# Patient Record
Sex: FEMALE | Employment: FULL TIME | URBAN - METROPOLITAN AREA
[De-identification: names, ages, dates, MRNs, and addresses within clinical notes are randomized per-mention and may not be internally consistent; named-entity substitution may affect disease eponyms.]

---

## 2023-03-09 ENCOUNTER — EVALUATION (OUTPATIENT)
Dept: PHYSICAL THERAPY | Facility: CLINIC | Age: 58
End: 2023-03-09

## 2023-03-09 DIAGNOSIS — M54.2 NECK PAIN: Primary | ICD-10-CM

## 2023-03-09 NOTE — PROGRESS NOTES
PT Evaluation     Today's date: 3/9/2023  Patient name: Phong Fox  : 1965  MRN: [id number]  Referring provider: Heath Abdalla MD  Dx:   Encounter Diagnosis     ICD-10-CM    1  Neck pain  M54 2                      Assessment  Assessment details: 61year old female patient reports to PT with chronic neck pain  No red flags noted and UQS showed gross R UE decreased strength mostly due to pain  Patient presents with high irritability of symptoms, so objective testing was limited, but patient has significant decreased cervical mobility and tension in her cervical musculature  Patient also has decreased motor coordination of postural and scapular musculature  Patient will benefit from skilled PT services to address current impairments and functional limitations to help patient return to her PLOF  Impairments: abnormal muscle firing, abnormal muscle tone, abnormal or restricted ROM, abnormal movement, activity intolerance, impaired physical strength, lacks appropriate home exercise program and pain with function    Symptom irritability: highUnderstanding of Dx/Px/POC: good   Prognosis: good    Goals  STG  1  Patient will be independent with completion of HEP throughout therapy  2  Patient will have at worst 7/10 pain so patient can sleep with less discomfort in 3 weeks  LTG  1  Patient will have decrease in irritability of symptoms so patient can tolerate her daily tasks with less difficulty in 4 weeks     2  Patient will reach predicted FOTO score at end of treatment    Plan  Patient would benefit from: skilled physical therapy  Planned therapy interventions: activity modification, abdominal trunk stabilization, joint mobilization, manual therapy, motor coordination training, neuromuscular re-education, patient education, strengthening, stretching, therapeutic activities, therapeutic exercise, home exercise program, functional ROM exercises, flexibility and body mechanics training  Frequency: 2x week  Duration in weeks: 6  Treatment plan discussed with: patient        Subjective Evaluation    History of Present Illness  Mechanism of injury: Patient has chronic neck and back pain for years  Patient notes R side of her neck is the worst  Patient notes she gets bad headaches due to her neck muscles being so tense  Patient has difficulty looking up and rotating her head  Patient notes she has radiating R UE pain that also goes numb in her thumb and index finger  Patient also has difficulty sleeping  Patient can't wear a bra due to it pulling on her neck  Patient notes she is in the talks with her neurosurgeon to maybe get surgery after this bout of physical therapy  Patient notes she also has fibromyalgia as she has days where she can't move  Patient notes had bout of PT services which didn't help much  Pain  Current pain ratin  At best pain ratin  At worst pain rating: 10  Quality: dull ache, burning, sharp, radiating, throbbing, tight and discomfort  Relieving factors: medications, change in position, ice and heat  Aggravating factors: sitting, standing, walking, stair climbing and lifting    Treatments  Previous treatment: injection treatment and physical therapy  Current treatment: physical therapy  Patient Goals  Patient goals for therapy: decreased pain, increased motion, increased strength and return to sport/leisure activities          Objective     Concurrent Complaints  Positive for night pain, disturbed sleep, dizziness and headaches   Negative for faints and nausea/motion sickness    Neurological Testing     Sensation   Cervical/Thoracic   Left   Intact: light touch    Right   Intact: light touch    Active Range of Motion   Cervical/Thoracic Spine       Cervical    Flexion:  with pain Restriction level: moderate  Extension:  with pain Restriction level: maximal  Left rotation:  with pain Restriction level: moderate  Right rotation:  with pain Restriction level: maximal    Strength/Myotome Testing   Cervical Spine     Left   Interossei strength (t1): 4    Right   Interossei strength (t1): 3+    Left Elbow   Flexion: 4  Extension: 4    Right Elbow   Flexion: 3+ (affected by pain)  Extension: 3+ (affected by pain)    Left Wrist/Hand   Wrist extension: 4  Wrist flexion: 4  Thumb extension: 4    Right Wrist/Hand   Wrist extension: 4  Wrist flexion: 4  Thumb extension: 4  Neuro Exam:     Headaches   Patient reports headaches: Yes                Precautions: highly irritable neck and low back symptoms that are chronic, DOESN'T LIKE TO LIE ON BACK AT ALL       Manuals 3/9            Seated cerv mx work                                                    Neuro Re-Ed             Seated chin tucks                          scap 4                                                                  Ther Ex             scap retractions seated r            Seated thoracic rotation B r                         UBE or treadmill             Seated thoracic extension             No moneys              Seated CT butterfly stretch If able                          Ther Activity                                       Gait Training                                       Modalities

## 2023-03-09 NOTE — LETTER
2023    Rm Chao Gretta  [address]    Patient: Jesús Atkinson   YOB: 1965   Date of Visit: 3/9/2023     Encounter Diagnosis     ICD-10-CM    1  Neck pain  M54 2           Dear Dr Maru oDminguez:    Thank you for your recent referral of Jesús Atkinson  Please review the attached evaluation summary from Marcella's recent visit  Please verify that you agree with the plan of care by signing the attached order  If you have any questions or concerns, please do not hesitate to call  I sincerely appreciate the opportunity to share in the care of one of your patients and hope to have another opportunity to work with you in the near future  Sincerely,    Gregory Bermudez, PT      Referring Provider:      I certify that I have read the below Plan of Care and certify the need for these services furnished under this plan of treatment while under my care  MD Navi Chao Cortes [id number] [facility] [address]  Via Fax: [phone]          PT Evaluation     Today's date: 3/9/2023  Patient name: Jesús Atkinson  : 1965  MRN: [id number]  Referring provider: Ronda Dixon MD  Dx:   Encounter Diagnosis     ICD-10-CM    1  Neck pain  M54 2                      Assessment  Assessment details: 61year old female patient reports to PT with chronic neck pain  No red flags noted and UQS showed gross R UE decreased strength mostly due to pain  Patient presents with high irritability of symptoms, so objective testing was limited, but patient has significant decreased cervical mobility and tension in her cervical musculature  Patient also has decreased motor coordination of postural and scapular musculature  Patient will benefit from skilled PT services to address current impairments and functional limitations to help patient return to her PLOF       Impairments: abnormal muscle firing, abnormal muscle tone, abnormal or restricted ROM, abnormal movement, activity intolerance, impaired physical strength, lacks appropriate home exercise program and pain with function    Symptom irritability: highUnderstanding of Dx/Px/POC: good   Prognosis: good    Goals  STG  1  Patient will be independent with completion of HEP throughout therapy  2  Patient will have at worst 7/10 pain so patient can sleep with less discomfort in 3 weeks  LTG  1  Patient will have decrease in irritability of symptoms so patient can tolerate her daily tasks with less difficulty in 4 weeks  2  Patient will reach predicted FOTO score at end of treatment    Plan  Patient would benefit from: skilled physical therapy  Planned therapy interventions: activity modification, abdominal trunk stabilization, joint mobilization, manual therapy, motor coordination training, neuromuscular re-education, patient education, strengthening, stretching, therapeutic activities, therapeutic exercise, home exercise program, functional ROM exercises, flexibility and body mechanics training  Frequency: 2x week  Duration in weeks: 6  Treatment plan discussed with: patient        Subjective Evaluation    History of Present Illness  Mechanism of injury: Patient has chronic neck and back pain for years  Patient notes R side of her neck is the worst  Patient notes she gets bad headaches due to her neck muscles being so tense  Patient has difficulty looking up and rotating her head  Patient notes she has radiating R UE pain that also goes numb in her thumb and index finger  Patient also has difficulty sleeping  Patient can't wear a bra due to it pulling on her neck  Patient notes she is in the talks with her neurosurgeon to maybe get surgery after this bout of physical therapy  Patient notes she also has fibromyalgia as she has days where she can't move  Patient notes had bout of PT services which didn't help much     Pain  Current pain ratin  At best pain rating: 4  At worst pain rating: 10  Quality: dull ache, burning, sharp, radiating, throbbing, tight and discomfort  Relieving factors: medications, change in position, ice and heat  Aggravating factors: sitting, standing, walking, stair climbing and lifting    Treatments  Previous treatment: injection treatment and physical therapy  Current treatment: physical therapy  Patient Goals  Patient goals for therapy: decreased pain, increased motion, increased strength and return to sport/leisure activities          Objective     Concurrent Complaints  Positive for night pain, disturbed sleep, dizziness and headaches  Negative for faints and nausea/motion sickness    Neurological Testing     Sensation   Cervical/Thoracic   Left   Intact: light touch    Right   Intact: light touch    Active Range of Motion   Cervical/Thoracic Spine       Cervical    Flexion:  with pain Restriction level: moderate  Extension:  with pain Restriction level: maximal  Left rotation:  with pain Restriction level: moderate  Right rotation:  with pain Restriction level: maximal    Strength/Myotome Testing   Cervical Spine     Left   Interossei strength (t1): 4    Right   Interossei strength (t1): 3+    Left Elbow   Flexion: 4  Extension: 4    Right Elbow   Flexion: 3+ (affected by pain)  Extension: 3+ (affected by pain)    Left Wrist/Hand   Wrist extension: 4  Wrist flexion: 4  Thumb extension: 4    Right Wrist/Hand   Wrist extension: 4  Wrist flexion: 4  Thumb extension: 4  Neuro Exam:     Headaches   Patient reports headaches: Yes               Precautions: highly irritable neck and low back symptoms that are chronic, DOESN'T LIKE TO LIE ON BACK AT ALL       Manuals 3/9            Seated cerv mx work                                                    Neuro Re-Ed             Seated chin tucks                          scap 4                                                                  Ther Ex             scap retractions seated r            Seated thoracic rotation B r                         UBE or treadmill             Seated thoracic extension             No moneys              Seated CT butterfly stretch If able                          Ther Activity                                       Gait Training                                       Modalities

## 2023-03-14 ENCOUNTER — OFFICE VISIT (OUTPATIENT)
Dept: PHYSICAL THERAPY | Facility: CLINIC | Age: 58
End: 2023-03-14

## 2023-03-14 DIAGNOSIS — M54.2 NECK PAIN: Primary | ICD-10-CM

## 2023-03-14 NOTE — PROGRESS NOTES
Daily Note     Today's date: 3/14/2023  Patient name: Maximus Avilez  : 1965  MRN: [id number]  Referring provider: Ryan Cagle MD  Dx:   Encounter Diagnosis     ICD-10-CM    1  Neck pain  M54 2                      Subjective: Patient notes has really bad R sided neck and R UE pain today  Objective: See treatment diary below      Assessment: Tolerated treatment well  Patient would benefit from continued PT  Treatment plan initiated  Follow up with soreness to know how to further progress  Plan: Progress treatment as tolerated         Precautions: highly irritable neck and low back symptoms that are chronic, DOESN'T LIKE TO LIE ON BACK AT ALL UNLESS HAS BOLSTER FOR A PILLOW      Manuals 3/9 3/14           Seated cerv mx work  [facility]                                                  Neuro Re-Ed             Seated chin tucks                          scap 4   2x8 6 5 lbs low rows, 10 5 lbs rows                                                                 Ther Ex             scap retractions seated r            Seated thoracic rotation B r                         UBE or treadmill  4 min            Seated thoracic extension  10x 3" holds            No moneys   2x10 orange            Seated CT butterfly stretch If able  10x 3" holds            Doorway pec stretch  Low 3x20" holds           R first rib mob                                       Ther Activity                                       Gait Training                                       Modalities

## 2023-03-17 ENCOUNTER — OFFICE VISIT (OUTPATIENT)
Dept: PHYSICAL THERAPY | Facility: CLINIC | Age: 58
End: 2023-03-17

## 2023-03-17 DIAGNOSIS — M54.2 NECK PAIN: Primary | ICD-10-CM

## 2023-03-17 NOTE — PROGRESS NOTES
Daily Note     Today's date: 3/17/2023  Patient name: Noris Harry  : 1965  MRN: [id number]  Referring provider: Rubi Frances MD  Dx:   Encounter Diagnosis     ICD-10-CM    1  Neck pain  M54 2                      Subjective: Patient notes actually had 2 days of relief after last visit  Objective: See treatment diary below      Assessment: Tolerated treatment well  Patient would benefit from continued PT  Treatment plan remained at same intensity as patient was fatigued after last treatment  Follow up with soreness to know how to further progress  Plan: Progress treatment as tolerated         Precautions: highly irritable neck and low back symptoms that are chronic, DOESN'T LIKE TO LIE ON BACK AT ALL UNLESS HAS BOLSTER FOR A PILLOW      Manuals 3/9 3/14 3/17          Seated cerv mx work  [facility]  [address]                                                  Neuro Re-Ed             Seated chin tucks                          scap 4   2x8 6 5 lbs low rows, 10 5 lbs rows   2x8 6 5 lbs low rows, 10 5 lbs rows                                                               Ther Ex             scap retractions seated r            Seated thoracic rotation B r                         UBE or treadmill  4 min  4 min           Seated thoracic extension  10x 3" holds  10x 3" holds          No moneys   2x10 orange  2x10 orange           Seated CT butterfly stretch If able  10x 3" holds  10x 3" holds          Doorway pec stretch  Low 3x20" holds Low 3x20" holds           R first rib mob                                       Ther Activity                                       Gait Training                                       Modalities

## 2023-03-20 ENCOUNTER — OFFICE VISIT (OUTPATIENT)
Dept: PHYSICAL THERAPY | Facility: CLINIC | Age: 58
End: 2023-03-20

## 2023-03-20 DIAGNOSIS — M54.2 NECK PAIN: Primary | ICD-10-CM

## 2023-03-20 NOTE — PROGRESS NOTES
Daily Note     Today's date: 3/20/2023  Patient name: Delma Hurt  : 1965  MRN: [id number]  Referring provider: Caroline Pelaez MD  Dx:   Encounter Diagnosis     ICD-10-CM    1  Neck pain  M54 2                      Subjective: Patient notes relief of pain after last visit again but R UE is in a lot of pain  It really bothers her when she sleeps  Objective: See treatment diary below      Assessment: Tolerated treatment well  Patient would benefit from continued PT  Treatment plan remained at same intensity as patient was fatigued after last treatment  Added chin tucks, which patient compensates with SCM while completing  Patient also weak in R upper trap which is evident with R shoulder depression  Follow up with soreness to know how to further progress  Plan: Progress treatment as tolerated         Precautions: highly irritable neck and low back symptoms that are chronic, DOESN'T LIKE TO LIE ON BACK AT ALL UNLESS HAS BOLSTER FOR A PILLOW      Manuals 3/9 3/14 3/17 3/20         Seated cerv mx work  [facility]  [address] [facility] [address]                                                Neuro Re-Ed             Seated chin tucks    10x 3" holds                       scap 4   2x8 6 5 lbs low rows, 10 5 lbs rows   2x8 6 5 lbs low rows, 10 5 lbs rows  2x10 grn low rows, blue rows                       V upper trap wall slides    2x10                                    Ther Ex             scap retractions seated r            Seated thoracic rotation B r                         UBE or treadmill  4 min  4 min  4 min          Seated thoracic extension  10x 3" holds  10x 3" holds 10x 3" holds          No moneys   2x10 orange  2x10 orange  2x10 orange          Seated CT butterfly stretch If able  10x 3" holds  10x 3" holds 10x 3" holds          Doorway pec stretch  Low 3x20" holds Low 3x20" holds  3x20" holds low          R first rib mob                                       Ther Activity Gait Training                                       Modalities

## 2023-03-23 ENCOUNTER — OFFICE VISIT (OUTPATIENT)
Dept: PHYSICAL THERAPY | Facility: CLINIC | Age: 58
End: 2023-03-23

## 2023-03-23 DIAGNOSIS — M54.2 NECK PAIN: Primary | ICD-10-CM

## 2023-03-23 NOTE — PROGRESS NOTES
Daily Note     Today's date: 3/23/2023  Patient name: Edgar Stoddard  : 1965  MRN: [id number]  Referring provider: Glo Reyes MD  Dx:   Encounter Diagnosis     ICD-10-CM    1  Neck pain  M54 2                      Subjective: Patient notes had no relief after last visit, was in a lot of pain  Objective: See treatment diary below      Assessment: Tolerated treatment well  Patient would benefit from continued PT  Treatment plan remained at same intensity as patient was fatigued and in pain after last treatment  Plan: Progress treatment as tolerated         Precautions: highly irritable neck and low back symptoms that are chronic, DOESN'T LIKE TO LIE ON BACK AT ALL UNLESS HAS BOLSTER FOR A PILLOW      Manuals 3/9 3/14 3/17 3/20 3/23        Seated cerv mx work  [facility]  [address]                     Assess neural tension      This visit                    Neuro Re-Ed             Seated chin tucks    10x 3" holds  12x 5" holds                      scap 4   2x8 6 5 lbs low rows, 10 5 lbs rows   2x8 6 5 lbs low rows, 10 5 lbs rows  2x10 grn low rows, blue rows  2x10 grn low rows, blue rows                      V upper trap wall slides    2x10  2x8                                   Ther Ex             scap retractions seated r            Seated thoracic rotation B r                         UBE or treadmill  4 min  4 min  4 min  4 min         Seated thoracic extension  10x 3" holds  10x 3" holds 10x 3" holds  12x 3" holds         No moneys   2x10 orange  2x10 orange  2x10 orange  2x10 orange         Seated CT butterfly stretch If able  10x 3" holds  10x 3" holds 10x 3" holds  10x 3" holds         Doorway pec stretch  Low 3x20" holds Low 3x20" holds  3x20" holds low  3x20" holds low        R first rib mob      This visit       Lying with bolster for pillow thoracic ext over hor       This visit       sidelying shoulder ER R      If tolerated       Ther Activity Gait Training                                       Modalities

## 2023-03-27 ENCOUNTER — OFFICE VISIT (OUTPATIENT)
Dept: PHYSICAL THERAPY | Facility: CLINIC | Age: 58
End: 2023-03-27

## 2023-03-27 DIAGNOSIS — M54.2 NECK PAIN: Primary | ICD-10-CM

## 2023-03-27 NOTE — PROGRESS NOTES
"Daily Note     Today's date: 3/27/2023  Patient name: Peterson Lyon  : 1965  MRN: [id number]  Referring provider: Eileen Thorne MD  Dx:   Encounter Diagnosis     ICD-10-CM    1  Neck pain  M54 2                      Subjective: Patient notes has stabbing pain in R side of neck  Objective: See treatment diary below      Assessment: Tolerated treatment well  Patient would benefit from continued PT  Patient had some relief post treatment  Plan: Progress treatment as tolerated         Precautions: highly irritable neck and low back symptoms that are chronic, DOESN'T LIKE TO LIE ON BACK AT ALL UNLESS HAS BOLSTER FOR A PILLOW      Manuals 3/9 3/14 3/17 3/20 3/23 3/27       Seated cerv mx work  [facility]  [address]                    Assess neural tension       This visit                   Neuro Re-Ed             Seated chin tucks    10x 3\" holds  12x 5\" holds  12x 5\" holds                     scap 4   2x8 6 5 lbs low rows, 10 5 lbs rows   2x8 6 5 lbs low rows, 10 5 lbs rows  2x10 grn low rows, blue rows  2x10 grn low rows, blue rows  2x10 grn low rows, blue rows                     V upper trap wall slides    2x10  2x8  2x8                                  Ther Ex             scap retractions seated r            Seated thoracic rotation B r                         UBE or treadmill  4 min  4 min  4 min  4 min  4 min        Seated thoracic extension  10x 3\" holds  10x 3\" holds 10x 3\" holds  12x 3\" holds  12x 3\" holds        No moneys   2x10 orange  2x10 orange  2x10 orange  2x10 orange  2x12 orange        Seated CT butterfly stretch If able  10x 3\" holds  10x 3\" holds 10x 3\" holds  10x 3\" holds  12x 5\" holds        Doorway pec stretch  Low 3x20\" holds Low 3x20\" holds  3x20\" holds low  3x20\" holds low 3x20\" holds low       R first rib mob      8x 3\" holds        Lying with bolster for pillow thoracic ext over hor        This visit      sidelying shoulder ER R      If tolerated       Ther Activity     " Gait Training                                       Modalities

## 2023-03-30 ENCOUNTER — OFFICE VISIT (OUTPATIENT)
Dept: PHYSICAL THERAPY | Facility: CLINIC | Age: 58
End: 2023-03-30

## 2023-03-30 DIAGNOSIS — M54.2 NECK PAIN: Primary | ICD-10-CM

## 2023-03-30 NOTE — PROGRESS NOTES
"Daily Note     Today's date: 3/30/2023  Patient name: Florecita Winn  : 1965  MRN: [id number]  Referring provider: Diana Vasquez MD  Dx:   Encounter Diagnosis     ICD-10-CM    1  Neck pain  M54 2                      Subjective: Patient notes neck is feeling a little better today  Objective: See treatment diary below      Assessment: Tolerated treatment well  Patient would benefit from continued PT  Treatment plan progressed  Plan: Progress treatment as tolerated         Precautions: highly irritable neck and low back symptoms that are chronic, DOESN'T LIKE TO LIE ON BACK AT ALL UNLESS HAS BOLSTER FOR A PILLOW      Manuals 3/9 3/14 3/17 3/20 3/23 3/27 3/30      Seated cerv mx work  [facility]  [address]                    Assess neural tension       This visit                   Neuro Re-Ed             Seated chin tucks    10x 3\" holds  12x 5\" holds  12x 5\" holds  15x 5\" holds                    scap 4   2x8 6 5 lbs low rows, 10 5 lbs rows   2x8 6 5 lbs low rows, 10 5 lbs rows  2x10 grn low rows, blue rows  2x10 grn low rows, blue rows  2x10 grn low rows, blue rows  2x12 grn low rows, blue rows                   V upper trap wall slides    2x10  2x8  2x8  2x10                                Ther Ex             scap retractions seated r            Seated thoracic rotation B r                         UBE or treadmill  4 min  4 min  4 min  4 min  4 min  4 min       Seated thoracic extension  10x 3\" holds  10x 3\" holds 10x 3\" holds  12x 3\" holds  12x 3\" holds  15x 3\" holds       No moneys   2x10 orange  2x10 orange  2x10 orange  2x10 orange  2x12 orange  3x10 orange       Seated CT butterfly stretch If able  10x 3\" holds  10x 3\" holds 10x 3\" holds  10x 3\" holds  12x 5\" holds  15x 5\" holds       Doorway pec stretch  Low 3x20\" holds Low 3x20\" holds  3x20\" holds low  3x20\" holds low 3x20\" holds low 3x20\" holds low      R first rib mob      8x 3\" holds  10x 3\" holds       Lying with bolster for pillow " thoracic ext over hor        This visit      sidelying shoulder ER R      If tolerated       Ther Activity                                       Gait Training                                       Modalities

## 2023-04-03 ENCOUNTER — OFFICE VISIT (OUTPATIENT)
Dept: PHYSICAL THERAPY | Facility: CLINIC | Age: 58
End: 2023-04-03

## 2023-04-03 DIAGNOSIS — M54.2 NECK PAIN: Primary | ICD-10-CM

## 2023-04-03 NOTE — PROGRESS NOTES
"Daily Note     Today's date: 4/3/2023  Patient name: Ger Morales  : 1965  MRN: [id number]  Referring provider: Gillian Messer MD  Dx:   Encounter Diagnosis     ICD-10-CM    1  Neck pain  M54 2                      Subjective: Patient notes overall R UE is feeling a little better  Today flared up due to doing a lot of driving      Objective: See treatment diary below      Assessment: Tolerated treatment well  Patient would benefit from continued PT  Treatment plan progressed  Plan: Progress treatment as tolerated         Precautions: highly irritable neck and low back symptoms that are chronic, DOESN'T LIKE TO LIE ON BACK AT ALL UNLESS HAS BOLSTER FOR A PILLOW      Manuals 3/9 3/14 3/17 3/20 3/23 3/27 3/30 4/3     Seated cerv mx work  [facility]  [address]                  Assess neural tension       This visit                   Neuro Re-Ed             Seated chin tucks    10x 3\" holds  12x 5\" holds  12x 5\" holds  15x 5\" holds  15x 5\" holds                   scap 4   2x8 6 5 lbs low rows, 10 5 lbs rows   2x8 6 5 lbs low rows, 10 5 lbs rows  2x10 grn low rows, blue rows  2x10 grn low rows, blue rows  2x10 grn low rows, blue rows  2x12 grn low rows, blue rows 20x 3\" holds grn low rows, black rows                   V upper trap wall slides    2x10  2x8  2x8  2x10 2x12                               Ther Ex             scap retractions seated r            Seated thoracic rotation B r                         UBE or treadmill  4 min  4 min  4 min  4 min  4 min  4 min  4 min      Seated thoracic extension  10x 3\" holds  10x 3\" holds 10x 3\" holds  12x 3\" holds  12x 3\" holds  15x 3\" holds  15x 5\" holds      No moneys   2x10 orange  2x10 orange  2x10 orange  2x10 orange  2x12 orange  3x10 orange  20x 3\" holds      Seated CT butterfly stretch If able  10x 3\" holds  10x 3\" holds 10x 3\" holds  10x 3\" holds  12x 5\" holds  15x 5\" holds  15x 5\" holds     Doorway pec stretch  Low 3x20\" holds Low 3x20\" holds  3x20\" " "holds low  3x20\" holds low 3x20\" holds low 3x20\" holds low 3x20\" holds low      R first rib mob      8x 3\" holds  10x 3\" holds  15x 5\" holds     Lying with bolster for pillow thoracic ext over hor        This visit      sidelying shoulder ER R      If tolerated       Ther Activity                                       Gait Training                                       Modalities                                            "

## 2023-04-06 ENCOUNTER — OFFICE VISIT (OUTPATIENT)
Dept: PHYSICAL THERAPY | Facility: CLINIC | Age: 58
End: 2023-04-06

## 2023-04-06 DIAGNOSIS — M54.2 NECK PAIN: Primary | ICD-10-CM

## 2023-04-06 NOTE — PROGRESS NOTES
"Daily Note     Today's date: 2023  Patient name: Anahy Ram  : 1965  MRN: [id number]  Referring provider: Mainor Turner MD  Dx:   Encounter Diagnosis     ICD-10-CM    1  Neck pain  M54 2           Start Time: 845  Stop Time:   Total time in clinic (min): 37 minutes    Subjective: Pt notes states she is a little bothered today  Notes some increased soreness after last visit  Objective: See treatment diary below    Assessment: Continued with same intensity as last visit  Tolerated treatment well  Fatigue noted in UT following upper trap wall slides  Patient demonstrated fatigue post treatment and would benefit from continued PT    Plan: Continue per plan of care  Progress treatment as tolerated         Precautions: highly irritable neck and low back symptoms that are chronic, DOESN'T LIKE TO LIE ON BACK AT ALL UNLESS HAS BOLSTER FOR A PILLOW      Manuals 3/9 3/14 3/17 3/20 3/23 3/27 3/30 4/3 4/6    Seated cerv mx work  [facility]  [address]                 Assess neural tension       This visit                   Neuro Re-Ed             Seated chin tucks    10x 3\" holds  12x 5\" holds  12x 5\" holds  15x 5\" holds  15x 5\" holds  15x 5\" holds                 scap 4   2x8 6 5 lbs low rows, 10 5 lbs rows   2x8 6 5 lbs low rows, 10 5 lbs rows  2x10 grn low rows, blue rows  2x10 grn low rows, blue rows  2x10 grn low rows, blue rows  2x12 grn low rows, blue rows 20x 3\" holds grn low rows, black rows  20x 3\" holds grn low rows, black rows                 V upper trap wall slides    2x10  2x8  2x8  2x10 2x12 2x12                              Ther Ex             scap retractions seated r            Seated thoracic rotation B r                         UBE or treadmill  4 min  4 min  4 min  4 min  4 min  4 min  4 min  4 min    Seated thoracic extension  10x 3\" holds  10x 3\" holds 10x 3\" holds  12x 3\" holds  12x 3\" holds  15x 3\" holds  15x 5\" holds  15x 5\" holds    No moneys   2x10 orange  2x10 " "orange  2x10 orange  2x10 orange  2x12 orange  3x10 orange  20x 3\" holds  20x 3\" holds    Seated CT butterfly stretch If able  10x 3\" holds  10x 3\" holds 10x 3\" holds  10x 3\" holds  12x 5\" holds  15x 5\" holds  15x 5\" holds 15x 5\" holds    Doorway pec stretch  Low 3x20\" holds Low 3x20\" holds  3x20\" holds low  3x20\" holds low 3x20\" holds low 3x20\" holds low 3x20\" holds low  3x20 holds low     R first rib mob      8x 3\" holds  10x 3\" holds  15x 5\" holds 15x 5\" holds    Lying with bolster for pillow thoracic ext over hor        This visit      sidelying shoulder ER R      If tolerated       Ther Activity                                       Gait Training                                       Modalities                                            "

## 2023-04-10 ENCOUNTER — APPOINTMENT (OUTPATIENT)
Dept: PHYSICAL THERAPY | Facility: CLINIC | Age: 58
End: 2023-04-10

## 2023-04-25 ENCOUNTER — OFFICE VISIT (OUTPATIENT)
Dept: PHYSICAL THERAPY | Facility: CLINIC | Age: 58
End: 2023-04-25

## 2023-04-25 DIAGNOSIS — M54.2 NECK PAIN: Primary | ICD-10-CM

## 2023-04-25 NOTE — PROGRESS NOTES
"Daily Note     Today's date: 2023  Patient name: Genna Jarrell  : 1965  MRN: [id number]  Referring provider: Coretta Polo MD  Dx:   Encounter Diagnosis     ICD-10-CM    1  Neck pain  M54 2           Start Time: 1400  Stop Time: 1435  Total time in clinic (min): 35 minutes    Subjective: MRI scheduled for tomorrow  Pt in a lot of pain, had to take medication to manage pain today  Location of pain the same, intensity increased  Objective: See treatment diary below    Assessment: Tolerated treatment well  Patient demonstrated fatigue post treatment and would benefit from continued PT    Plan: Continue per plan of care  Progress treatment as tolerated         Precautions: highly irritable neck and low back symptoms that are chronic, DOESN'T LIKE TO LIE ON BACK AT ALL UNLESS HAS BOLSTER FOR A PILLOW      Manuals 3/14 3/17 3/20 3/23 3/27 3/30 4/3 4/6 4/12 4/25   Seated cerv mx work [facility]  [address] [facility] [address]                Assess neural tension      This visit                    Neuro Re-Ed             Seated chin tucks   10x 3\" holds  12x 5\" holds  12x 5\" holds  15x 5\" holds  15x 5\" holds  15x 5\" holds 15x5\" holds 15x5\" holds                scap 4  2x8 6 5 lbs low rows, 10 5 lbs rows   2x8 6 5 lbs low rows, 10 5 lbs rows  2x10 grn low rows, blue rows  2x10 grn low rows, blue rows  2x10 grn low rows, blue rows  2x12 grn low rows, blue rows 20x 3\" holds grn low rows, black rows  20x 3\" holds grn low rows, black rows 20x3\" holds grn low rows, black rows 20x3\" holds grn low rows, black rows                V upper trap wall slides   2x10  2x8  2x8  2x10 2x12 2x12 2x12 2x12                             Ther Ex             scap retractions seated             Seated thoracic rotation B                          UBE or treadmill 4 min  4 min  4 min  4 min  4 min  4 min  4 min  4 min 4 min   4 min   Seated thoracic extension 10x 3\" holds  10x 3\" holds 10x 3\" holds  12x 3\" holds  12x 3\" holds  15x 3\" " "holds  15x 5\" holds  15x 5\" holds 15x5: holds 15x5: holds   No moneys  2x10 orange  2x10 orange  2x10 orange  2x10 orange  2x12 orange  3x10 orange  20x 3\" holds  20x 3\" holds 20x3\" holds GTB 2x10   Seated CT butterfly stretch 10x 3\" holds  10x 3\" holds 10x 3\" holds  10x 3\" holds  12x 5\" holds  15x 5\" holds  15x 5\" holds 15x 5\" holds 15x5\" holds 2x10  5\" holds   Doorway pec stretch Low 3x20\" holds Low 3x20\" holds  3x20\" holds low  3x20\" holds low 3x20\" holds low 3x20\" holds low 3x20\" holds low  3x20 holds low  3x20 holds low 3x20\" holds low   R first rib mob     8x 3\" holds  10x 3\" holds  15x 5\" holds 15x 5\" holds 15x5\" holds 3\" holds 2x10   Lying with bolster for pillow thoracic ext over hor       This visit       sidelying shoulder ER R     If tolerated        Ther Activity                                       Gait Training                                       Modalities                                            "

## 2023-04-27 ENCOUNTER — APPOINTMENT (OUTPATIENT)
Dept: PHYSICAL THERAPY | Facility: CLINIC | Age: 58
End: 2023-04-27

## 2023-05-03 ENCOUNTER — OFFICE VISIT (OUTPATIENT)
Dept: PHYSICAL THERAPY | Facility: CLINIC | Age: 58
End: 2023-05-03

## 2023-05-03 DIAGNOSIS — M54.2 NECK PAIN: Primary | ICD-10-CM

## 2023-07-06 ENCOUNTER — OFFICE VISIT (OUTPATIENT)
Dept: ORTHOPEDICS | Facility: CLINIC | Age: 58
End: 2023-07-06
Payer: COMMERCIAL

## 2023-07-06 ENCOUNTER — APPOINTMENT (OUTPATIENT)
Dept: RADIOLOGY | Age: 58
End: 2023-07-06
Payer: COMMERCIAL

## 2023-07-06 ENCOUNTER — HOSPITAL ENCOUNTER (OUTPATIENT)
Dept: RADIOLOGY | Facility: CLINIC | Age: 58
Discharge: HOME | End: 2023-07-06
Attending: PHYSICIAN ASSISTANT
Payer: COMMERCIAL

## 2023-07-06 DIAGNOSIS — M48.02 FORAMINAL STENOSIS OF CERVICAL REGION: ICD-10-CM

## 2023-07-06 DIAGNOSIS — M48.02 CERVICAL STENOSIS OF SPINAL CANAL: ICD-10-CM

## 2023-07-06 DIAGNOSIS — G95.89 MYELOMALACIA (CMS/HCC): Primary | ICD-10-CM

## 2023-07-06 DIAGNOSIS — M54.2 NECK PAIN: ICD-10-CM

## 2023-07-06 DIAGNOSIS — G95.9 MYELOPATHY (CMS/HCC): ICD-10-CM

## 2023-07-06 DIAGNOSIS — M54.12 CERVICAL RADICULOPATHY: ICD-10-CM

## 2023-07-06 DIAGNOSIS — M54.2 NECK PAIN: Primary | ICD-10-CM

## 2023-07-06 PROCEDURE — 72050 X-RAY EXAM NECK SPINE 4/5VWS: CPT

## 2023-07-06 PROCEDURE — 99205 OFFICE O/P NEW HI 60 MIN: CPT | Performed by: STUDENT IN AN ORGANIZED HEALTH CARE EDUCATION/TRAINING PROGRAM

## 2023-07-06 RX ORDER — METHYLPREDNISOLONE 4 MG/1
TABLET ORAL
Qty: 21 TABLET | Refills: 0 | Status: SHIPPED | OUTPATIENT
Start: 2023-07-06 | End: 2023-07-14

## 2023-07-06 NOTE — PROGRESS NOTES
[facility]      Subjective   Patient ID: Ame Gipson is a 57 y.o. female.    Chief Complaint :     Cervical myelopathy  Cervical radiculopathy  New patient      History of Present Illness:    Ame Gipson is a very pleasant 57-year-old female, presents to the clinic today with chief complaint of balance difficulty, loss of hand dexterity, gait abnormalities, right upper extremity pain.  Pain and balance/hand dexterity issues have been present for the past several years.  She was previously seen and evaluated by spine surgery, they discussed surgical intervention.  I am her second opinion for this matter.    Regarding her radicular pain, pain goes along the right lateral lumbar spine, right lateral forearm, into the thumb and index finger.        Past Medical History :  No past medical history on file.      Past Surgical History :  No past surgical history on file.    Family History :  No family history on file.    Social History :   Social History     Social History Narrative   • Not on file       REVIEW OF SYSTEMS :   Review of Systems    Review of systems negative except as otherwise stated in HPI    Allergies: Patient has no allergy information on record.    No current facility-administered medications for this visit.        Medication List          Accurate as of July 6, 2023  1:01 PM. If you have any questions, ask your nurse or doctor.            START taking these medications    methylPREDNISolone 4 mg tablet  Commonly known as: MEDROL DOSEPACK  Follow package directions.  Started by: Milton Acevedo DO            Physical Examination : There were no vitals taken for this visit.    Cont: Awake, alert, oriented    HEENT: Normocephalic, atraumatic    Resp: Chest equal rise with inspiration    Psych: Appropriate mood, appropriate affect    Extremity Motor Exam:     RUE: Deltoid 5/5 , Biceps 5/5 , Wrist Extension 5/5 , Triceps 5/5 , Wrist flexors 5/5, Hand  5/5, Interossi 5/5.      LUE: Deltoid 5/5 , Biceps 5/5 , Wrist Extension 5/5 , Triceps 5/5 , Wrist flexors 5/5, Hand  5/5, Interossi 5/5.       Sensory Exam:     C5-T1 SILT RIGHT UE    C5-T1 SILT LEFT UE      Vascular exam:     Right - Palpable radial pulse, palpable posterior tibial pulse    Left - Palpable radial pulse, palpable posterior tibial pulse    Reflexes:     Right - Thomas positive, 1/4 Biceps, 1/4 Brachioradialis, 1/4 Triceps, 1/4 Patella, 1/4 Achilles, no dysdiadokinesia present, negative  release test    Left -  Thomas negative, 1/4 Biceps, 1/4 Brachioradialis, 1/4 Triceps, 1/4 Patella, 1/4 Achilles, no dysdiadokinesia present, negative  release test      MSK:     Normal gait    No walking assistance devices required      IMAGING :     I reviewed the pertinent imaging regarding today's spine exam.  I reviewed the corresponding radiologist's report. I reviewed the pertinent images with the patient during today's exam.    Cervical MRI personal interpretation:   Central canal stenosis C4-C5 with myelomalacia.    Central canal stenosis C5-C6.    C6-C7: Bilateral neuroforaminal stenosis, left worse than right          LABS:     No results found for: HGBA1C, ESTAVGGLUC     IMPRESSION :     Cervical myelopathy  Cervical myelomalacia  Cervical radiculopathy  C4-C5 central canal stenosis  C5-C6 central canal stenosis    PLAN :     I discussed the patient's diagnosis and treatment options with them in detail today in the office.  We discussed at length her history, physical exam, cervical MRI, cervical x-rays.  We discussed her clinical history is concerning for both progressive cervical myelopathy as well as cervical radiculopathy in the right C6 distribution.  We also discussed her prior treatment modalities.  She is considering surgical intervention.  Plan for secondary opinion for this matter.    We discussed at length cervical myelopathy and the stepwise decline patients may experience.  We discussed the purpose  of surgical intervention in the setting of cervical myelopathy is to hopefully prevent decline of symptomatology, although this is not a guarantee.  We also discussed the overall goals of surgical intervention in the setting of cervical radiculopathy, hope is to relief of arm pain, however there is unpredictability in her case due to the longevity of symptoms and concern for chronic nerve damage.    She has maximized conservative treat modalities at this point.  I would not recommend cervical epidural steroid injections due to her cervical myelopathy with myelomalacia and central cervical stenosis.    We discussed surgical intervention.  Preliminary surgical plan is C4-C5, C5-C6 ACDF.  We discussed that due to her anterior and posterior compression at these levels, we would reevaluate at 6 weeks and assess if additional posterior cervical based surgery will be necessary.    We discussed at length risks and benefits of nonoperative and operative treatment modalities.    We discussed the risks of anterior cervical based surgery extensively, specifically, the neurologic risks including nerve injury, paralysis, spinal cord injury, dural tear, spinal fluid leakage, pseudomeningocele, arachnoiditis, hematoma with neurologic consequences, esophageal injury, tracheal injury, vascular injury, vertebral artery injury, carotid artery injury, traction neuritis, reflex sympathetic dystrophy, additional upper and lower extremity neurologic causalgias, as well as persistent neurologic symptomatology and/or new symptomatology were reviewed.  We discussed C5 nerve palsy.     Infectious complication including superficial and deep wound infection, spinal meningitis, osteomyelitis, discitis, epidural abscess, and the need definitively for further surgery and/or long-term intravenous antibiotic treatment or wound management or revision were outlined.    We discussed the risk of hardware migration, hardware failure, need for additional  surgery, possible posterior cervical surgery, nonunion, symptomatic hardware.     Medical complications including cardiopulmonary, cardiovascular, cerebrovascular, gastrointestinal, as well as thromboembolic phenomenon were reviewed. The possibility of deep vein thrombosis leading to a pulmonary embolism was outlined as well as the cardiac risks, cerebrovascular risks, pulmonary risks, and other medical unpredictable outcomes related to the use of general anesthesia, stress of surgery, and underlying either diagnosed or undiagnosed medical comorbidities,up to and including death.      The patient will require an extended stay, an anterior cervical drain and close neurologic monitoring postoperatively.    The patient was in understanding and in agreement with the treatment plan, and all questions were answered.    She is considering surgical dates.  We will continue to follow-up with her.    Patient was instructed to notify me if he develops progressively worsening pain, weakness, sensory loss, bilaterality of upper or lower extremities, loss of hand dexterity or balance issues gait disturbance, bowel or bladder    60 minutes was spent interviewing and examining the patient, discussing pertinent imaging, as well as coordinating and discussing a treatment plan.    Milton Acevedo, DO  7/6/2023  1:01 PM      NOTE: Some or all of the note above was created with the use of dictation software, please note this dictation software can have anomalies in its ability to transcribe. Please contact me directly for anything that seems abnormal for clarification

## 2023-07-10 ENCOUNTER — PREP FOR CASE (OUTPATIENT)
Dept: ORTHOPEDICS | Facility: CLINIC | Age: 58
End: 2023-07-10
Payer: COMMERCIAL

## 2023-07-10 DIAGNOSIS — M54.12 CERVICAL RADICULOPATHY: ICD-10-CM

## 2023-07-10 DIAGNOSIS — G95.9 CERVICAL MYELOPATHY (CMS/HCC): Primary | ICD-10-CM

## 2023-07-14 ENCOUNTER — APPOINTMENT (OUTPATIENT)
Dept: LAB | Facility: HOSPITAL | Age: 58
End: 2023-07-14
Attending: STUDENT IN AN ORGANIZED HEALTH CARE EDUCATION/TRAINING PROGRAM
Payer: COMMERCIAL

## 2023-07-14 ENCOUNTER — OFFICE VISIT (OUTPATIENT)
Dept: PREADMISSION TESTING | Facility: HOSPITAL | Age: 58
End: 2023-07-14
Attending: STUDENT IN AN ORGANIZED HEALTH CARE EDUCATION/TRAINING PROGRAM
Payer: COMMERCIAL

## 2023-07-14 ENCOUNTER — TRANSCRIBE ORDERS (OUTPATIENT)
Dept: LAB | Facility: HOSPITAL | Age: 58
End: 2023-07-14

## 2023-07-14 VITALS
RESPIRATION RATE: 18 BRPM | HEIGHT: 67 IN | WEIGHT: 204 LBS | DIASTOLIC BLOOD PRESSURE: 78 MMHG | TEMPERATURE: 97.3 F | OXYGEN SATURATION: 97 % | HEART RATE: 79 BPM | BODY MASS INDEX: 32.02 KG/M2 | SYSTOLIC BLOOD PRESSURE: 141 MMHG

## 2023-07-14 DIAGNOSIS — Z01.818 PREOP EXAMINATION: Primary | ICD-10-CM

## 2023-07-14 DIAGNOSIS — M54.12 RADICULOPATHY, CERVICAL REGION: ICD-10-CM

## 2023-07-14 DIAGNOSIS — G43.909 MIGRAINE WITHOUT STATUS MIGRAINOSUS, NOT INTRACTABLE, UNSPECIFIED MIGRAINE TYPE: ICD-10-CM

## 2023-07-14 DIAGNOSIS — G95.9 DISEASE OF SPINAL CORD, UNSPECIFIED: ICD-10-CM

## 2023-07-14 DIAGNOSIS — G95.9 DISEASE OF SPINAL CORD, UNSPECIFIED: Primary | ICD-10-CM

## 2023-07-14 DIAGNOSIS — G47.9 SLEEP DIFFICULTIES: ICD-10-CM

## 2023-07-14 LAB
ABO + RH BLD: NORMAL
ANION GAP SERPL CALC-SCNC: 5 MEQ/L (ref 3–15)
APTT PPP: 30 SEC (ref 23–35)
BASOPHILS # BLD: 0.03 K/UL (ref 0.01–0.1)
BASOPHILS NFR BLD: 0.4 %
BLD GP AB SCN SERPL QL: NEGATIVE
BLOOD BANK CMNT PATIENT-IMP: NORMAL
BUN SERPL-MCNC: 17 MG/DL (ref 7–25)
CALCIUM SERPL-MCNC: 9.3 MG/DL (ref 8.6–10.3)
CHLORIDE SERPL-SCNC: 100 MEQ/L (ref 98–107)
CO2 SERPL-SCNC: 32 MEQ/L (ref 21–31)
CREAT SERPL-MCNC: 0.7 MG/DL (ref 0.6–1.2)
D AG BLD QL: NEGATIVE
DIFFERENTIAL METHOD BLD: NORMAL
EOSINOPHIL # BLD: 0.06 K/UL (ref 0.04–0.36)
EOSINOPHIL NFR BLD: 0.7 %
ERYTHROCYTE [DISTWIDTH] IN BLOOD BY AUTOMATED COUNT: 11.9 % (ref 11.7–14.4)
EST. AVERAGE GLUCOSE BLD GHB EST-MCNC: 103 MG/DL
GFR SERPL CREATININE-BSD FRML MDRD: >60 ML/MIN/1.73M*2
GLUCOSE SERPL-MCNC: 91 MG/DL (ref 70–99)
HBA1C MFR BLD: 5.2 %
HCT VFR BLDCO AUTO: 39.6 % (ref 35–45)
HGB BLD-MCNC: 13.4 G/DL (ref 11.8–15.7)
IMM GRANULOCYTES # BLD AUTO: 0.05 K/UL (ref 0–0.08)
IMM GRANULOCYTES NFR BLD AUTO: 0.6 %
INR PPP: 1.1
LABORATORY COMMENT REPORT: NORMAL
LYMPHOCYTES # BLD: 1.98 K/UL (ref 1.2–3.5)
LYMPHOCYTES NFR BLD: 24.7 %
MCH RBC QN AUTO: 30.5 PG (ref 28–33.2)
MCHC RBC AUTO-ENTMCNC: 33.8 G/DL (ref 32.2–35.5)
MCV RBC AUTO: 90 FL (ref 83–98)
MONOCYTES # BLD: 0.56 K/UL (ref 0.28–0.8)
MONOCYTES NFR BLD: 7 %
NEUTROPHILS # BLD: 5.33 K/UL (ref 1.7–7)
NEUTS SEG NFR BLD: 66.6 %
NRBC BLD-RTO: 0 %
PDW BLD AUTO: 10.2 FL (ref 9.4–12.3)
PLATELET # BLD AUTO: 217 K/UL (ref 150–369)
POTASSIUM SERPL-SCNC: 5 MEQ/L (ref 3.5–5.1)
PROTHROMBIN TIME: 13.7 SEC (ref 12.2–14.5)
RBC # BLD AUTO: 4.4 M/UL (ref 3.93–5.22)
SODIUM SERPL-SCNC: 137 MEQ/L (ref 136–145)
SPECIMEN EXP DATE BLD: NORMAL
WBC # BLD AUTO: 8.01 K/UL (ref 3.8–10.5)

## 2023-07-14 PROCEDURE — 99203 OFFICE O/P NEW LOW 30 MIN: CPT | Performed by: INTERNAL MEDICINE

## 2023-07-14 PROCEDURE — 86850 RBC ANTIBODY SCREEN: CPT

## 2023-07-14 PROCEDURE — 85730 THROMBOPLASTIN TIME PARTIAL: CPT

## 2023-07-14 PROCEDURE — 83036 HEMOGLOBIN GLYCOSYLATED A1C: CPT

## 2023-07-14 PROCEDURE — 36415 COLL VENOUS BLD VENIPUNCTURE: CPT

## 2023-07-14 PROCEDURE — 80048 BASIC METABOLIC PNL TOTAL CA: CPT

## 2023-07-14 PROCEDURE — 85025 COMPLETE CBC W/AUTO DIFF WBC: CPT

## 2023-07-14 PROCEDURE — 3008F BODY MASS INDEX DOCD: CPT | Performed by: INTERNAL MEDICINE

## 2023-07-14 PROCEDURE — 85610 PROTHROMBIN TIME: CPT

## 2023-07-14 RX ORDER — ACETAMINOPHEN 500 MG
1000 TABLET ORAL
COMMUNITY

## 2023-07-14 RX ORDER — TRAZODONE HYDROCHLORIDE 150 MG/1
TABLET ORAL
COMMUNITY
Start: 2023-06-27 | End: 2023-07-27 | Stop reason: HOSPADM

## 2023-07-14 RX ORDER — AMITRIPTYLINE HYDROCHLORIDE 10 MG/1
2 TABLET, FILM COATED ORAL NIGHTLY
COMMUNITY
Start: 2023-07-07

## 2023-07-14 RX ORDER — METHYLPREDNISOLONE 4 MG/1
TABLET ORAL
Qty: 42 TABLET | Refills: 0 | Status: SHIPPED | OUTPATIENT
Start: 2023-07-14 | End: 2023-08-07 | Stop reason: SDUPTHER

## 2023-07-14 RX ORDER — UBROGEPANT 50 MG/1
TABLET ORAL SEE ADMIN INSTRUCTIONS
COMMUNITY
Start: 2023-05-01

## 2023-07-14 RX ORDER — ALBUTEROL SULFATE 90 UG/1
INHALANT RESPIRATORY (INHALATION)
COMMUNITY

## 2023-07-14 ASSESSMENT — PAIN SCALES - GENERAL: PAINLEVEL: 9
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
## 2023-07-14 NOTE — PRE-PROCEDURE INSTRUCTIONS
1. We will call you between 3 pm and 7 pm on July 25, 2023 to determine that arrival time for your procedure. If you do not hear by 6PM. Please call [phone] for arrival time.     2. Please report to Main Entrance near Parking lot A, walk into main lobby and report to the admission desk on the first floor on the day of your procedure.    3. Please follow the following fasting guidelines:     No solid food EIGHT HOURS prior to surgery.  Unlimited clear liquids, meaning water or PLAIN black coffee WITHOUT any milk, cream, sugar, or sweetener are permitted up to TWO HOURS prior to arrival at the hospital.    4. Please take ONLY the following medications with a sip of water on the morning of your procedure:   None    Stop all supplements and Aleve, Motrin, or Advil.  You may take Tylenol for pain.    5. Other Instructions: You may brush your teeth the morning of the procedure. Rinse and spit, do not swallow.  Bring a list of your medications with dosages.  Use surgical wash as       directed.     6. If you develop a cold, cough, fever, rash, or any other symptom prior to the day of the procedure, please report it to your physician immediately.    7. If you need to cancel the procedure for any reason, please contact your physician.    8. Make arrangements to have safe transportation home accompanied by a responsible adult. If you have not arranged safe transportation home, your surgery will be cancelled. Safe transportation may include private vehicle, ride-share service, taxi and public transportation when accompanied by a responsible adult who will assist you home. A responsible adult is someone known to you and does not include the taxi, ride-share or public transit drive transporting you.    9. You may not take public transportation unless accompanied by a responsible person.    10. You may not drive a car or operate complex or potentially dangerous machinery for 24 hours following anesthesia and/or  sedation.    11.  If it is medically necessary for you to have a longer stay, you will be informed as soon as the decision is made.    12. Only bring essential items to the hospital.  Do not wear or bring anything of value to the hospital including jewelry of any kind, money, or wallet. Do not wear make-up or contact lenses.  DO NOT BRING MEDICATIONS FROM HOME unless instructed to do so. DO bring your hearing aids, glasses, and a case    13. No lotion, creams, powders, or oils on skin the morning of procedure     14. Dress in comfortable clothes.    15.  If instructed, please bring a copy of your Advanced Directive (Living Will/Durable Power of ) on the day of your procedure.     16. Ensuring your safety at all times is a very important part of our [facility] Culture of Safety. After having surgery and sedation, you are at risk for falling and balance issues. Although you may feel awake, the effects of the medication can last up to 24 hours after anesthesia. If you need to use the bathroom during your recovery period, nursing staff will escort you there and stay with you to ensure your safety.    17. Refrain from drinking alcohol and smoking cigarettes/ marijuana for 24 hours prior to surgery.    18. Shower with antibacterial soap (Dial) the night before and morning of your procedure.  If your procedure indicates the need for CHG antiseptic wash (Bactoshield or Hibiclens), please use this instead and follow instructions as discussed at the time of your Pre-Admission Testing phone interview or visit.    Above instructions reviewed with patient and patient acknowledges understanding.
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
## 2023-07-25 ENCOUNTER — ANESTHESIA EVENT (OUTPATIENT)
Dept: OPERATING ROOM | Facility: HOSPITAL | Age: 58
Setting detail: HOSPITAL OUTPATIENT SURGERY
End: 2023-07-25
Payer: COMMERCIAL

## 2023-07-25 NOTE — ANESTHESIA PREPROCEDURE EVALUATION
Relevant Problems   NEUROLOGY   (+) Cervical radiculopathy       Anesthesia ROS/MED HX      Musculoskeletal  Chronic musculoskeletal pain       Past Surgical History:   Procedure Laterality Date   • CHOLECYSTECTOMY     • ENDOMETRIAL ABLATION         Physical Exam    Airway   Mallampati: III   TM distance: >3 FB   Neck ROM: full  Cardiovascular - normal   Rhythm: regular   Rate: normalPulmonary - normal   clear to auscultation  Dental - normal        Anesthesia Plan    Plan: general    Technique: general endotracheal     Lines and Monitors: PIV     Airway: oral intubation   2 ASA  Anesthetic plan and risks discussed with: patient  Induction:    intravenous   Postop Plan:   Pain Management: IV analgesics

## 2023-07-26 ENCOUNTER — HOSPITAL ENCOUNTER (OUTPATIENT)
Facility: HOSPITAL | Age: 58
Discharge: HOME | End: 2023-07-27
Attending: STUDENT IN AN ORGANIZED HEALTH CARE EDUCATION/TRAINING PROGRAM | Admitting: STUDENT IN AN ORGANIZED HEALTH CARE EDUCATION/TRAINING PROGRAM
Payer: COMMERCIAL

## 2023-07-26 ENCOUNTER — APPOINTMENT (OUTPATIENT)
Dept: RADIOLOGY | Facility: HOSPITAL | Age: 58
Setting detail: HOSPITAL OUTPATIENT SURGERY
End: 2023-07-26
Attending: STUDENT IN AN ORGANIZED HEALTH CARE EDUCATION/TRAINING PROGRAM
Payer: COMMERCIAL

## 2023-07-26 ENCOUNTER — ANESTHESIA (OUTPATIENT)
Dept: OPERATING ROOM | Facility: HOSPITAL | Age: 58
Setting detail: HOSPITAL OUTPATIENT SURGERY
End: 2023-07-26
Payer: COMMERCIAL

## 2023-07-26 LAB
ABO + RH BLD: NORMAL
D AG BLD QL: NEGATIVE
LABORATORY COMMENT REPORT: NORMAL

## 2023-07-26 PROCEDURE — 25000000 HC PHARMACY GENERAL: Performed by: NURSE ANESTHETIST, CERTIFIED REGISTERED

## 2023-07-26 PROCEDURE — 27800000 HC SUPPLY/IMPLANTS: Performed by: STUDENT IN AN ORGANIZED HEALTH CARE EDUCATION/TRAINING PROGRAM

## 2023-07-26 PROCEDURE — 63700000 HC SELF-ADMINISTRABLE DRUG: Performed by: STUDENT IN AN ORGANIZED HEALTH CARE EDUCATION/TRAINING PROGRAM

## 2023-07-26 PROCEDURE — 0RB30ZZ EXCISION OF CERVICAL VERTEBRAL DISC, OPEN APPROACH: ICD-10-PCS | Performed by: STUDENT IN AN ORGANIZED HEALTH CARE EDUCATION/TRAINING PROGRAM

## 2023-07-26 PROCEDURE — C1713 ANCHOR/SCREW BN/BN,TIS/BN: HCPCS | Performed by: STUDENT IN AN ORGANIZED HEALTH CARE EDUCATION/TRAINING PROGRAM

## 2023-07-26 PROCEDURE — 22551 ARTHRD ANT NTRBDY CERVICAL: CPT | Mod: AS | Performed by: ORTHOPAEDIC SURGERY

## 2023-07-26 PROCEDURE — 97162 PT EVAL MOD COMPLEX 30 MIN: CPT | Mod: GP

## 2023-07-26 PROCEDURE — 22551 ARTHRD ANT NTRBDY CERVICAL: CPT | Performed by: STUDENT IN AN ORGANIZED HEALTH CARE EDUCATION/TRAINING PROGRAM

## 2023-07-26 PROCEDURE — 0RG20A0 FUSION OF 2 OR MORE CERVICAL VERTEBRAL JOINTS WITH INTERBODY FUSION DEVICE, ANTERIOR APPROACH, ANTERIOR COLUMN, OPEN APPROACH: ICD-10-PCS | Performed by: STUDENT IN AN ORGANIZED HEALTH CARE EDUCATION/TRAINING PROGRAM

## 2023-07-26 PROCEDURE — 97116 GAIT TRAINING THERAPY: CPT | Mod: GP

## 2023-07-26 PROCEDURE — 37000001 HC ANESTHESIA GENERAL: Performed by: STUDENT IN AN ORGANIZED HEALTH CARE EDUCATION/TRAINING PROGRAM

## 2023-07-26 PROCEDURE — 25800000 HC PHARMACY IV SOLUTIONS: Performed by: PHYSICIAN ASSISTANT

## 2023-07-26 PROCEDURE — 63700000 HC SELF-ADMINISTRABLE DRUG: Performed by: PHYSICIAN ASSISTANT

## 2023-07-26 PROCEDURE — 22845 INSERT SPINE FIXATION DEVICE: CPT | Mod: XU | Performed by: STUDENT IN AN ORGANIZED HEALTH CARE EDUCATION/TRAINING PROGRAM

## 2023-07-26 PROCEDURE — 71000011 HC PACU PHASE 1 EA ADDL MIN: Performed by: STUDENT IN AN ORGANIZED HEALTH CARE EDUCATION/TRAINING PROGRAM

## 2023-07-26 PROCEDURE — 63600000 HC DRUGS/DETAIL CODE: Mod: JZ | Performed by: STUDENT IN AN ORGANIZED HEALTH CARE EDUCATION/TRAINING PROGRAM

## 2023-07-26 PROCEDURE — 22552 ARTHRD ANT NTRBD CERVICAL EA: CPT | Mod: AS | Performed by: ORTHOPAEDIC SURGERY

## 2023-07-26 PROCEDURE — 0RG2070 FUSION OF 2 OR MORE CERVICAL VERTEBRAL JOINTS WITH AUTOLOGOUS TISSUE SUBSTITUTE, ANTERIOR APPROACH, ANTERIOR COLUMN, OPEN APPROACH: ICD-10-PCS | Performed by: STUDENT IN AN ORGANIZED HEALTH CARE EDUCATION/TRAINING PROGRAM

## 2023-07-26 PROCEDURE — 36000014 HC OR LEVEL 4 EA ADDL MIN: Performed by: STUDENT IN AN ORGANIZED HEALTH CARE EDUCATION/TRAINING PROGRAM

## 2023-07-26 PROCEDURE — 22853 INSJ BIOMECHANICAL DEVICE: CPT | Mod: AS | Performed by: ORTHOPAEDIC SURGERY

## 2023-07-26 PROCEDURE — 36000004 HC OR LEVEL 4 INITIAL 30MIN: Performed by: STUDENT IN AN ORGANIZED HEALTH CARE EDUCATION/TRAINING PROGRAM

## 2023-07-26 PROCEDURE — 71000001 HC PACU PHASE 1 INITIAL 30MIN: Performed by: STUDENT IN AN ORGANIZED HEALTH CARE EDUCATION/TRAINING PROGRAM

## 2023-07-26 PROCEDURE — 25800000 HC PHARMACY IV SOLUTIONS: Performed by: NURSE ANESTHETIST, CERTIFIED REGISTERED

## 2023-07-26 PROCEDURE — 22845 INSERT SPINE FIXATION DEVICE: CPT | Mod: AS,XU | Performed by: ORTHOPAEDIC SURGERY

## 2023-07-26 PROCEDURE — 63600000 HC DRUGS/DETAIL CODE: Performed by: PHYSICIAN ASSISTANT

## 2023-07-26 PROCEDURE — 63600000 HC DRUGS/DETAIL CODE: Performed by: ANESTHESIOLOGY

## 2023-07-26 PROCEDURE — 36415 COLL VENOUS BLD VENIPUNCTURE: CPT | Performed by: STUDENT IN AN ORGANIZED HEALTH CARE EDUCATION/TRAINING PROGRAM

## 2023-07-26 PROCEDURE — L0172 CERV COL SR FOAM 2PC PRE OTS: HCPCS | Performed by: STUDENT IN AN ORGANIZED HEALTH CARE EDUCATION/TRAINING PROGRAM

## 2023-07-26 PROCEDURE — 63600000 HC DRUGS/DETAIL CODE: Mod: JZ | Performed by: NURSE ANESTHETIST, CERTIFIED REGISTERED

## 2023-07-26 PROCEDURE — 0RG20K0 FUSION OF 2 OR MORE CERVICAL VERTEBRAL JOINTS WITH NONAUTOLOGOUS TISSUE SUBSTITUTE, ANTERIOR APPROACH, ANTERIOR COLUMN, OPEN APPROACH: ICD-10-PCS | Performed by: STUDENT IN AN ORGANIZED HEALTH CARE EDUCATION/TRAINING PROGRAM

## 2023-07-26 PROCEDURE — 25800000 HC PHARMACY IV SOLUTIONS: Performed by: ORTHOPAEDIC SURGERY

## 2023-07-26 PROCEDURE — 27200000 HC STERILE SUPPLY: Performed by: STUDENT IN AN ORGANIZED HEALTH CARE EDUCATION/TRAINING PROGRAM

## 2023-07-26 PROCEDURE — 72020 X-RAY EXAM OF SPINE 1 VIEW: CPT

## 2023-07-26 PROCEDURE — 22853 INSJ BIOMECHANICAL DEVICE: CPT | Performed by: STUDENT IN AN ORGANIZED HEALTH CARE EDUCATION/TRAINING PROGRAM

## 2023-07-26 PROCEDURE — 20930 SP BONE ALGRFT MORSEL ADD-ON: CPT | Performed by: STUDENT IN AN ORGANIZED HEALTH CARE EDUCATION/TRAINING PROGRAM

## 2023-07-26 PROCEDURE — 22552 ARTHRD ANT NTRBD CERVICAL EA: CPT | Performed by: STUDENT IN AN ORGANIZED HEALTH CARE EDUCATION/TRAINING PROGRAM

## 2023-07-26 DEVICE — IMPLANTABLE DEVICE: Type: IMPLANTABLE DEVICE | Site: SPINE CERVICAL | Status: FUNCTIONAL

## 2023-07-26 DEVICE — MATRIX BONE CELLULAR VIVIGEN 1CC: Type: IMPLANTABLE DEVICE | Site: SPINE CERVICAL | Status: FUNCTIONAL

## 2023-07-26 RX ORDER — OXYCODONE HYDROCHLORIDE 5 MG/1
5 TABLET ORAL EVERY 4 HOURS PRN
Status: DISCONTINUED | OUTPATIENT
Start: 2023-07-26 | End: 2023-07-27 | Stop reason: HOSPADM

## 2023-07-26 RX ORDER — EPHEDRINE SULFATE 50 MG/ML
INJECTION, SOLUTION INTRAVENOUS AS NEEDED
Status: DISCONTINUED | OUTPATIENT
Start: 2023-07-26 | End: 2023-07-26 | Stop reason: SURG

## 2023-07-26 RX ORDER — AMITRIPTYLINE HYDROCHLORIDE 10 MG/1
20 TABLET, FILM COATED ORAL NIGHTLY
Status: DISCONTINUED | OUTPATIENT
Start: 2023-07-26 | End: 2023-07-26

## 2023-07-26 RX ORDER — DEXTROSE 40 %
15-30 GEL (GRAM) ORAL AS NEEDED
Status: ACTIVE | OUTPATIENT
Start: 2023-07-26 | End: 2023-07-27

## 2023-07-26 RX ORDER — ACETAMINOPHEN 325 MG/1
975 TABLET ORAL EVERY 6 HOURS
Status: DISCONTINUED | OUTPATIENT
Start: 2023-07-26 | End: 2023-07-27 | Stop reason: HOSPADM

## 2023-07-26 RX ORDER — KETAMINE HYDROCHLORIDE 10 MG/ML
INJECTION, SOLUTION INTRAMUSCULAR; INTRAVENOUS AS NEEDED
Status: DISCONTINUED | OUTPATIENT
Start: 2023-07-26 | End: 2023-07-26 | Stop reason: SURG

## 2023-07-26 RX ORDER — IBUPROFEN 200 MG
16-32 TABLET ORAL AS NEEDED
Status: DISCONTINUED | OUTPATIENT
Start: 2023-07-26 | End: 2023-07-26 | Stop reason: HOSPADM

## 2023-07-26 RX ORDER — ONDANSETRON HYDROCHLORIDE 2 MG/ML
4 INJECTION, SOLUTION INTRAVENOUS
Status: DISCONTINUED | OUTPATIENT
Start: 2023-07-26 | End: 2023-07-26 | Stop reason: HOSPADM

## 2023-07-26 RX ORDER — AMITRIPTYLINE HYDROCHLORIDE 10 MG/1
20 TABLET, FILM COATED ORAL NIGHTLY
Status: DISCONTINUED | OUTPATIENT
Start: 2023-07-26 | End: 2023-07-27 | Stop reason: HOSPADM

## 2023-07-26 RX ORDER — DEXTROSE 50 % IN WATER (D50W) INTRAVENOUS SYRINGE
25 AS NEEDED
Status: ACTIVE | OUTPATIENT
Start: 2023-07-26 | End: 2023-07-27

## 2023-07-26 RX ORDER — PROPOFOL 10 MG/ML
INJECTION, EMULSION INTRAVENOUS AS NEEDED
Status: DISCONTINUED | OUTPATIENT
Start: 2023-07-26 | End: 2023-07-26 | Stop reason: SURG

## 2023-07-26 RX ORDER — IBUPROFEN 200 MG
16-32 TABLET ORAL AS NEEDED
Status: ACTIVE | OUTPATIENT
Start: 2023-07-26 | End: 2023-07-27

## 2023-07-26 RX ORDER — FENTANYL CITRATE 50 UG/ML
50 INJECTION, SOLUTION INTRAMUSCULAR; INTRAVENOUS EVERY 5 MIN PRN
Status: COMPLETED | OUTPATIENT
Start: 2023-07-26 | End: 2023-07-26

## 2023-07-26 RX ORDER — DEXTROSE 40 %
15-30 GEL (GRAM) ORAL AS NEEDED
Status: DISCONTINUED | OUTPATIENT
Start: 2023-07-26 | End: 2023-07-26 | Stop reason: HOSPADM

## 2023-07-26 RX ORDER — POLYETHYLENE GLYCOL 3350 17 G/17G
17 POWDER, FOR SOLUTION ORAL DAILY
Status: DISCONTINUED | OUTPATIENT
Start: 2023-07-26 | End: 2023-07-27 | Stop reason: HOSPADM

## 2023-07-26 RX ORDER — ONDANSETRON 4 MG/1
4 TABLET, ORALLY DISINTEGRATING ORAL EVERY 8 HOURS PRN
Status: DISCONTINUED | OUTPATIENT
Start: 2023-07-26 | End: 2023-07-27 | Stop reason: HOSPADM

## 2023-07-26 RX ORDER — CYCLOBENZAPRINE HCL 5 MG
10 TABLET ORAL 3 TIMES DAILY PRN
Status: DISCONTINUED | OUTPATIENT
Start: 2023-07-26 | End: 2023-07-27 | Stop reason: HOSPADM

## 2023-07-26 RX ORDER — SODIUM CHLORIDE 9 MG/ML
INJECTION, SOLUTION INTRAVENOUS CONTINUOUS
Status: ACTIVE | OUTPATIENT
Start: 2023-07-26 | End: 2023-07-27

## 2023-07-26 RX ORDER — ALBUTEROL SULFATE 90 UG/1
1 INHALANT RESPIRATORY (INHALATION) EVERY 6 HOURS PRN
Status: DISCONTINUED | OUTPATIENT
Start: 2023-07-26 | End: 2023-07-27 | Stop reason: HOSPADM

## 2023-07-26 RX ORDER — SODIUM CHLORIDE, SODIUM GLUCONATE, SODIUM ACETATE, POTASSIUM CHLORIDE AND MAGNESIUM CHLORIDE 30; 37; 368; 526; 502 MG/100ML; MG/100ML; MG/100ML; MG/100ML; MG/100ML
INJECTION, SOLUTION INTRAVENOUS CONTINUOUS PRN
Status: DISCONTINUED | OUTPATIENT
Start: 2023-07-26 | End: 2023-07-26 | Stop reason: SURG

## 2023-07-26 RX ORDER — DEXAMETHASONE SODIUM PHOSPHATE 4 MG/ML
10 INJECTION, SOLUTION INTRA-ARTICULAR; INTRALESIONAL; INTRAMUSCULAR; INTRAVENOUS; SOFT TISSUE
Status: COMPLETED | OUTPATIENT
Start: 2023-07-26 | End: 2023-07-27

## 2023-07-26 RX ORDER — CYCLOBENZAPRINE HCL 5 MG
5 TABLET ORAL 3 TIMES DAILY PRN
Status: DISCONTINUED | OUTPATIENT
Start: 2023-07-26 | End: 2023-07-26

## 2023-07-26 RX ORDER — GLYCOPYRROLATE 0.6MG/3ML
SYRINGE (ML) INTRAVENOUS AS NEEDED
Status: DISCONTINUED | OUTPATIENT
Start: 2023-07-26 | End: 2023-07-26 | Stop reason: SURG

## 2023-07-26 RX ORDER — DEXAMETHASONE SODIUM PHOSPHATE 4 MG/ML
INJECTION, SOLUTION INTRA-ARTICULAR; INTRALESIONAL; INTRAMUSCULAR; INTRAVENOUS; SOFT TISSUE AS NEEDED
Status: DISCONTINUED | OUTPATIENT
Start: 2023-07-26 | End: 2023-07-26

## 2023-07-26 RX ORDER — OXYCODONE HYDROCHLORIDE 5 MG/1
10 TABLET ORAL EVERY 4 HOURS PRN
Status: DISCONTINUED | OUTPATIENT
Start: 2023-07-26 | End: 2023-07-27 | Stop reason: HOSPADM

## 2023-07-26 RX ORDER — HYDROMORPHONE HYDROCHLORIDE 1 MG/ML
0.5 INJECTION, SOLUTION INTRAMUSCULAR; INTRAVENOUS; SUBCUTANEOUS
Status: DISCONTINUED | OUTPATIENT
Start: 2023-07-26 | End: 2023-07-26 | Stop reason: HOSPADM

## 2023-07-26 RX ORDER — ONDANSETRON HYDROCHLORIDE 2 MG/ML
4 INJECTION, SOLUTION INTRAVENOUS EVERY 8 HOURS PRN
Status: DISCONTINUED | OUTPATIENT
Start: 2023-07-26 | End: 2023-07-27 | Stop reason: HOSPADM

## 2023-07-26 RX ORDER — PHENYLEPHRINE HCL IN 0.9% NACL 50MG/250ML
10-200 PLASTIC BAG, INJECTION (ML) INTRAVENOUS
Status: DISCONTINUED | OUTPATIENT
Start: 2023-07-26 | End: 2023-07-26

## 2023-07-26 RX ORDER — DIPHENHYDRAMINE HCL 50 MG/ML
25 VIAL (ML) INJECTION EVERY 6 HOURS PRN
Status: DISCONTINUED | OUTPATIENT
Start: 2023-07-26 | End: 2023-07-27 | Stop reason: HOSPADM

## 2023-07-26 RX ORDER — DEXTROSE 50 % IN WATER (D50W) INTRAVENOUS SYRINGE
25 AS NEEDED
Status: DISCONTINUED | OUTPATIENT
Start: 2023-07-26 | End: 2023-07-26 | Stop reason: HOSPADM

## 2023-07-26 RX ORDER — DIPHENHYDRAMINE HCL 25 MG
25 CAPSULE ORAL EVERY 6 HOURS PRN
Status: DISCONTINUED | OUTPATIENT
Start: 2023-07-26 | End: 2023-07-27 | Stop reason: HOSPADM

## 2023-07-26 RX ORDER — IBUPROFEN 200 MG
16-32 TABLET ORAL AS NEEDED
Status: DISCONTINUED | OUTPATIENT
Start: 2023-07-26 | End: 2023-07-26 | Stop reason: SDUPTHER

## 2023-07-26 RX ORDER — DEXTROSE 40 %
15-30 GEL (GRAM) ORAL AS NEEDED
Status: DISCONTINUED | OUTPATIENT
Start: 2023-07-26 | End: 2023-07-26 | Stop reason: SDUPTHER

## 2023-07-26 RX ORDER — DEXAMETHASONE SODIUM PHOSPHATE 4 MG/ML
INJECTION, SOLUTION INTRA-ARTICULAR; INTRALESIONAL; INTRAMUSCULAR; INTRAVENOUS; SOFT TISSUE AS NEEDED
Status: DISCONTINUED | OUTPATIENT
Start: 2023-07-26 | End: 2023-07-26 | Stop reason: SURG

## 2023-07-26 RX ORDER — ONDANSETRON HYDROCHLORIDE 2 MG/ML
INJECTION, SOLUTION INTRAVENOUS AS NEEDED
Status: DISCONTINUED | OUTPATIENT
Start: 2023-07-26 | End: 2023-07-26 | Stop reason: SURG

## 2023-07-26 RX ORDER — LIDOCAINE HYDROCHLORIDE 10 MG/ML
INJECTION, SOLUTION INFILTRATION; PERINEURAL AS NEEDED
Status: DISCONTINUED | OUTPATIENT
Start: 2023-07-26 | End: 2023-07-26 | Stop reason: SURG

## 2023-07-26 RX ORDER — MIDAZOLAM HYDROCHLORIDE 2 MG/2ML
INJECTION, SOLUTION INTRAMUSCULAR; INTRAVENOUS AS NEEDED
Status: DISCONTINUED | OUTPATIENT
Start: 2023-07-26 | End: 2023-07-26 | Stop reason: SURG

## 2023-07-26 RX ORDER — CYCLOBENZAPRINE HCL 5 MG
5 TABLET ORAL ONCE
Status: COMPLETED | OUTPATIENT
Start: 2023-07-26 | End: 2023-07-26

## 2023-07-26 RX ORDER — AMOXICILLIN 250 MG
1 CAPSULE ORAL 2 TIMES DAILY
Status: DISCONTINUED | OUTPATIENT
Start: 2023-07-26 | End: 2023-07-27 | Stop reason: HOSPADM

## 2023-07-26 RX ORDER — HYDROMORPHONE HYDROCHLORIDE 1 MG/ML
INJECTION, SOLUTION INTRAMUSCULAR; INTRAVENOUS; SUBCUTANEOUS AS NEEDED
Status: DISCONTINUED | OUTPATIENT
Start: 2023-07-26 | End: 2023-07-26 | Stop reason: SURG

## 2023-07-26 RX ORDER — SODIUM CHLORIDE 9 MG/ML
INJECTION, SOLUTION INTRAVENOUS CONTINUOUS PRN
Status: DISCONTINUED | OUTPATIENT
Start: 2023-07-26 | End: 2023-07-26 | Stop reason: SURG

## 2023-07-26 RX ORDER — ALUMINUM HYDROXIDE, MAGNESIUM HYDROXIDE, AND SIMETHICONE 1200; 120; 1200 MG/30ML; MG/30ML; MG/30ML
30 SUSPENSION ORAL EVERY 4 HOURS PRN
Status: DISCONTINUED | OUTPATIENT
Start: 2023-07-26 | End: 2023-07-27 | Stop reason: HOSPADM

## 2023-07-26 RX ORDER — FENTANYL CITRATE 50 UG/ML
INJECTION, SOLUTION INTRAMUSCULAR; INTRAVENOUS AS NEEDED
Status: DISCONTINUED | OUTPATIENT
Start: 2023-07-26 | End: 2023-07-26 | Stop reason: SURG

## 2023-07-26 RX ORDER — DEXTROSE 50 % IN WATER (D50W) INTRAVENOUS SYRINGE
25 AS NEEDED
Status: DISCONTINUED | OUTPATIENT
Start: 2023-07-26 | End: 2023-07-26 | Stop reason: SDUPTHER

## 2023-07-26 RX ADMIN — CEFAZOLIN 2 G: 2 INJECTION, POWDER, FOR SOLUTION INTRAMUSCULAR; INTRAVENOUS at 07:54

## 2023-07-26 RX ADMIN — MIDAZOLAM HYDROCHLORIDE 2 MG: 1 INJECTION, SOLUTION INTRAMUSCULAR; INTRAVENOUS at 07:06

## 2023-07-26 RX ADMIN — HYDROMORPHONE HYDROCHLORIDE 0.5 MG: 1 INJECTION, SOLUTION INTRAMUSCULAR; INTRAVENOUS; SUBCUTANEOUS at 08:37

## 2023-07-26 RX ADMIN — ONDANSETRON HYDROCHLORIDE 4 MG: 2 SOLUTION INTRAMUSCULAR; INTRAVENOUS at 10:48

## 2023-07-26 RX ADMIN — EPHEDRINE SULFATE 10 MG: 50 INJECTION, SOLUTION INTRAVENOUS at 08:05

## 2023-07-26 RX ADMIN — HYDROMORPHONE HYDROCHLORIDE 0.5 MG: 1 INJECTION, SOLUTION INTRAMUSCULAR; INTRAVENOUS; SUBCUTANEOUS at 11:15

## 2023-07-26 RX ADMIN — SODIUM CHLORIDE, SODIUM GLUCONATE, SODIUM ACETATE, POTASSIUM CHLORIDE AND MAGNESIUM CHLORIDE: 526; 502; 368; 37; 30 INJECTION, SOLUTION INTRAVENOUS at 08:33

## 2023-07-26 RX ADMIN — PROPOFOL 120 MCG/KG/MIN: 10 INJECTION, EMULSION INTRAVENOUS at 07:23

## 2023-07-26 RX ADMIN — SODIUM CHLORIDE, SODIUM GLUCONATE, SODIUM ACETATE, POTASSIUM CHLORIDE AND MAGNESIUM CHLORIDE: 526; 502; 368; 37; 30 INJECTION, SOLUTION INTRAVENOUS at 07:19

## 2023-07-26 RX ADMIN — Medication 1 SPRAY: at 20:21

## 2023-07-26 RX ADMIN — OXYCODONE HYDROCHLORIDE 10 MG: 5 TABLET ORAL at 15:55

## 2023-07-26 RX ADMIN — HYDROMORPHONE HYDROCHLORIDE 0.5 MG: 1 INJECTION, SOLUTION INTRAMUSCULAR; INTRAVENOUS; SUBCUTANEOUS at 08:13

## 2023-07-26 RX ADMIN — SODIUM CHLORIDE: 9 INJECTION, SOLUTION INTRAVENOUS at 23:02

## 2023-07-26 RX ADMIN — Medication 1 SPRAY: at 17:14

## 2023-07-26 RX ADMIN — ACETAMINOPHEN 975 MG: 325 TABLET, FILM COATED ORAL at 18:47

## 2023-07-26 RX ADMIN — GLYCOPYRROLATE 0.2 MG: 0.2 INJECTION, SOLUTION INTRAMUSCULAR; INTRAVITREAL at 07:51

## 2023-07-26 RX ADMIN — POLYETHYLENE GLYCOL 3350 17 G: 17 POWDER, FOR SOLUTION ORAL at 15:55

## 2023-07-26 RX ADMIN — PROPOFOL 100 MG: 10 INJECTION, EMULSION INTRAVENOUS at 07:25

## 2023-07-26 RX ADMIN — HYDROMORPHONE HYDROCHLORIDE 0.5 MG: 1 INJECTION, SOLUTION INTRAMUSCULAR; INTRAVENOUS; SUBCUTANEOUS at 10:10

## 2023-07-26 RX ADMIN — CEFAZOLIN 2 G: 2 INJECTION, POWDER, FOR SOLUTION INTRAMUSCULAR; INTRAVENOUS at 23:02

## 2023-07-26 RX ADMIN — SODIUM CHLORIDE: 9 INJECTION, SOLUTION INTRAVENOUS at 07:06

## 2023-07-26 RX ADMIN — SENNOSIDES AND DOCUSATE SODIUM 1 TABLET: 50; 8.6 TABLET ORAL at 15:56

## 2023-07-26 RX ADMIN — LIDOCAINE HYDROCHLORIDE 5 ML: 10 INJECTION, SOLUTION INFILTRATION; PERINEURAL at 07:15

## 2023-07-26 RX ADMIN — SODIUM CHLORIDE: 9 INJECTION, SOLUTION INTRAVENOUS at 11:40

## 2023-07-26 RX ADMIN — SUCCINYLCHOLINE CHLORIDE 120 MG: 20 INJECTION, SOLUTION INTRAMUSCULAR; INTRAVENOUS; PARENTERAL at 07:16

## 2023-07-26 RX ADMIN — CEFAZOLIN 2 G: 2 INJECTION, POWDER, FOR SOLUTION INTRAMUSCULAR; INTRAVENOUS at 15:57

## 2023-07-26 RX ADMIN — HYDROMORPHONE HYDROCHLORIDE 0.5 MG: 1 INJECTION, SOLUTION INTRAMUSCULAR; INTRAVENOUS; SUBCUTANEOUS at 10:44

## 2023-07-26 RX ADMIN — CYCLOBENZAPRINE HYDROCHLORIDE 5 MG: 5 TABLET, FILM COATED ORAL at 15:56

## 2023-07-26 RX ADMIN — FENTANYL CITRATE 50 MCG: 50 INJECTION, SOLUTION INTRAMUSCULAR; INTRAVENOUS at 11:35

## 2023-07-26 RX ADMIN — ACETAMINOPHEN 975 MG: 325 TABLET, FILM COATED ORAL at 23:02

## 2023-07-26 RX ADMIN — DEXAMETHASONE SODIUM PHOSPHATE 10 MG: 4 INJECTION, SOLUTION INTRAMUSCULAR; INTRAVENOUS at 07:51

## 2023-07-26 RX ADMIN — Medication 50 MCG/MIN: at 07:45

## 2023-07-26 RX ADMIN — TRAZODONE HYDROCHLORIDE 150 MG: 100 TABLET ORAL at 23:02

## 2023-07-26 RX ADMIN — FENTANYL CITRATE 100 MCG: 50 INJECTION, SOLUTION INTRAMUSCULAR; INTRAVENOUS at 07:15

## 2023-07-26 RX ADMIN — ONDANSETRON HYDROCHLORIDE 4 MG: 2 SOLUTION INTRAMUSCULAR; INTRAVENOUS at 07:51

## 2023-07-26 RX ADMIN — Medication 50 MG: at 07:16

## 2023-07-26 RX ADMIN — AMITRIPTYLINE HYDROCHLORIDE 20 MG: 10 TABLET, FILM COATED ORAL at 23:02

## 2023-07-26 RX ADMIN — SODIUM CHLORIDE 0.2 MCG/KG/MIN: 9 INJECTION, SOLUTION INTRAVENOUS at 07:23

## 2023-07-26 RX ADMIN — DEXAMETHASONE SODIUM PHOSPHATE 10 MG: 4 INJECTION, SOLUTION INTRA-ARTICULAR; INTRALESIONAL; INTRAMUSCULAR; INTRAVENOUS; SOFT TISSUE at 18:48

## 2023-07-26 RX ADMIN — CYCLOBENZAPRINE HYDROCHLORIDE 5 MG: 5 TABLET, FILM COATED ORAL at 12:30

## 2023-07-26 RX ADMIN — OXYCODONE HYDROCHLORIDE 10 MG: 5 TABLET ORAL at 20:26

## 2023-07-26 RX ADMIN — PROPOFOL 150 MG: 10 INJECTION, EMULSION INTRAVENOUS at 07:15

## 2023-07-26 RX ADMIN — HYDROMORPHONE HYDROCHLORIDE 0.5 MG: 1 INJECTION, SOLUTION INTRAMUSCULAR; INTRAVENOUS; SUBCUTANEOUS at 12:21

## 2023-07-26 RX ADMIN — FENTANYL CITRATE 50 MCG: 50 INJECTION, SOLUTION INTRAMUSCULAR; INTRAVENOUS at 11:56

## 2023-07-26 ASSESSMENT — COGNITIVE AND FUNCTIONAL STATUS - GENERAL
WALKING IN HOSPITAL ROOM: 3 - A LITTLE
MOVING TO AND FROM BED TO CHAIR: 3 - A LITTLE
CLIMB 3 TO 5 STEPS WITH RAILING: 3 - A LITTLE
STANDING UP FROM CHAIR USING ARMS: 3 - A LITTLE
AFFECT: WNL

## 2023-07-26 NOTE — ANESTHESIOLOGIST PRE-PROCEDURE ATTESTATION
Pre-Procedure Patient Identification:  I am the Primary Anesthesiologist and have identified the patient on 07/26/23 at 6:54 AM.   I have confirmed the procedure(s) will be performed by the following surgeon/proceduralist Milton Acevedo DO.

## 2023-07-26 NOTE — PROGRESS NOTES
Physical Therapy -  Initial Evaluation     Patient: Ame Gipson  Location: [facility] Operating Room OR  MRN: [id number]  Today's date: 7/26/2023    HISTORY OF PRESENT ILLNESS     Ame is a 57 y.o. female admitted on 7/26/2023 with Cervical myelopathy (CMS/HCC) [G95.9]  Cervical radiculopathy [M54.12]. Principal problem is Cervical radiculopathy.    Past Medical History  Ame has a past medical history of Lipid disorder.    History of Present Illness   has bilateral neck/shoulder pain, numbness to her right face and entire right arm, paresthesias at times in her left arm, weak  bilaterally, gait change/stumbles, weak legs and feet, and constant overall pain. Now s/p C4 to 6 ACDF, collar at all times, hx of falls.     PRIOR LEVEL OF FUNCTION AND LIVING ENVIRONMENT     Prior Level of Function    Flowsheet Row Most Recent Value   Dominant Hand right   Ambulation independent   Transferring independent   Toileting independent   Bathing independent   Dressing independent   Eating independent        Prior Living Environment    Flowsheet Row Most Recent Value   People in Home spouse, child(neetu), adult   Current Living Arrangements home   Living Environment Comment 2 SH 2 and 1 [place] no rail, FF inside with rail. home built in late 1800's. uneven floors, steep staircase.        VITALS AND PAIN     PT Vitals    Date/Time Pulse SpO2 BP [facility]   07/26/23 1331 116 98 % 135/98 KARTHIK   07/26/23 1332 122 92 % 140/100 KARTHIK      PT Pain    Date/Time Pain Type Side/Orientation Location Rating: Rest Rating: Activity Description Interventions [facility]   07/26/23 1331 Pain Assessment generalized neck 7 7 aching position adjusted KARTHIK        Objective   OBJECTIVE     Start time:  1331  End time:  1400  Session Length: 29 min  Mode of Treatment: individual therapy, physical therapy    General Observations  Patient received supine. She was agreeable to therapy, no issues or concerns identified by nurse prior to session. cervical  collar at all times    Precautions: spinal, fall        Services  Do You Speak a Language Other Than English at Home?: no      PT Eval and Treat - 07/26/23 1331        Cognition    Orientation Status oriented x 4     Affect/Mental Status WNL        Sensory Assessment    Sensory Assessment sensation intact, lower extremities        Lower Extremity Assessment    General Observations ROM WNL's in B LE's. weakness in R quad and ankle 4/5.        Bed Mobility    Bed Mobility Activities supine to sit;sit to supine     [place] supervision     Assistive Device none        Sit/Stand Transfer    Surface edge of bed     [place] minimum assist (75% or more patient effort)     Safety/Cues hand placement;verbal cues     Assistive Device none        Gait Training    [place], Gait minimum assist (75% or more patient effort);2 person assist     Safety/Cues verbal cues;hand placement     Assistive Device --   iv pole and HHA x 2    Pattern step-through     Deviations/Abnormal Patterns gait speed decreased        Balance    Static Sitting Balance WNL     Dynamic Sitting Balance WNL     Sit to Stand Dynamic Balance WNL     Static Standing Balance WFL     Dynamic Standing Balance mild impairment        Impairments/Safety Issues    Impairments Affecting Function balance;motor control                               Education Documentation  Pain Management, taught by Kee Dunbar PT at 7/26/2023  2:14 PM.  Learner: Patient  Readiness: Eager  Method: Explanation  Response: Verbalizes Understanding  Comment: instructed pt on the use of RN's to A with pain control        Session Outcome  Patient supine at end of session, seatbelt alarm on, all needs met, call light in reach. Nursing notified about change in vital signs, patient's performance, patient's position, and patient's response to therapy/activity.    AM-PAC™ - Mobility (Current Function)     Turning form your back to your side while in flat bed without  using bedrails 3 - A Little   Moving from lying on your back to sitting on the side of a flat bed without using bedrails 3 - A Little   Moving to and from a bed to a chair 3 - A Little   Standing up from a chair using your arms 3 - A Little   To walk in a hospital room 3 - A Little   Climbing 3-5 steps with a railing 3 - A Little   AM-PAC™ Mobility Score 18      ASSESSMENT AND PLAN     Progress Summary  Pt presents supine in bed with intermittent tremors in R UE and LE. Notes having moderate cervical spine pains. Reports having 3 falls and the last one approximately 3 weeks ago with L knee abrasions. Pt will benefit from ongoing therapy during hospital stay. Anticpate return home with transition to outpt PT.    Patient/Family Therapy Goals Statement: return home    PT Plan    Flowsheet Row Most Recent Value   Rehab Potential good, to achieve stated therapy goals at 07/26/2023 1331   Therapy Frequency daily at 07/26/2023 1331   Planned Therapy Interventions balance training, bed mobility training, gait training, patient/family education, stair training, transfer training at 07/26/2023 1331          PT Discharge Recommendations    Flowsheet Row Most Recent Value   PT Recommended Discharge Disposition home with assistance, home with outpatient services at 07/26/2023 1331   Anticipated Equipment Needs at Discharge (PT) walker, front-wheeled at 07/26/2023 1331               PT Goals    Flowsheet Row Most Recent Value   Bed Mobility Goal 1    Activity/Assistive Device bed mobility activities, all at 07/26/2023 1331   [place] modified independence at 07/26/2023 1331   Time Frame 3-5 days at 07/26/2023 1331   Progress/Outcome goal ongoing at 07/26/2023 1331   Transfer Goal 1    Activity/Assistive Device all transfers at 07/26/2023 1331   [place] modified independence at 07/26/2023 1331   Time Frame 3-5 days at 07/26/2023 1331   Progress/Outcome goal ongoing at 07/26/2023 1331   Gait Training Goal 1     Activity/Assistive Device gait (walking locomotion) at 07/26/2023 1331   [place] modified independence at 07/26/2023 1331   Distance 250 at 07/26/2023 1331   Time Frame 3-5 days at 07/26/2023 1331   Progress/Outcome goal ongoing at 07/26/2023 1331   Stairs Goal 1    Activity/Assistive Device stairs, all skills at 07/26/2023 1331   [place] supervision required at 07/26/2023 1331   Number of Stairs 12 at 07/26/2023 1331   Time Frame 3-5 days at 07/26/2023 1331   Progress/Outcome goal ongoing at 07/26/2023 1331

## 2023-07-26 NOTE — OP NOTE
OPERATIVE REPORT      DATE OF PROCEDURE:    PRE OPERATIVE DIAGNOSIS:    1.Cervical myelopathy  2.Cervical myelomalacia  3.Cervical radiculopathy  4.Cervical central stenosis C4-C6  5.Cervical neuroforaminal stenosis C4-C6  6.Cervical degenerative disc disease    POST OPERATIVE DIAGNOSIS:    Same    PROCEDURE PERFORMED:    1.C4/C5 Anterior cervical arthrodesis  2.C4/C5 Anterior interbody cage  3.C5/C6 Anterior cervical arthrodesis  4.C5/C6 Anterior interbody cage  5.Anterior cervical plating (3 segments)  6.Allograft    SURGEON: Milton Acevedo DO    ASSISTANT: Diego Valles, PAC    ANESTHESIA: General endotraceal    ANTIBIOTICS: 2 grams Ancef    DVT PROPHYLAXIS: Venodyne boots were initiated after general anesthetic.    ESTIMATED BLOOD LOSS: 10 cc EBL    INTRAVENOUS FLUIDS: 1700 cc    URINE OUTPUT: 1700 cc    DRAINS: None    INSTRUMENTATION USED: 4 Web cervical interbody cage ([id number] x 6mm height x 7 degree lordosis). Depuy uniplate    NEUROMONITORING: Stable throughout entirety of case    COMPLICATIONS: None     INDICATIONS:     Ame is a very pleasant 57-year-old female, presented to my office on 7/6/2023    Second surgical opinion.  Her initial complaints to me were balance difficulty, loss of hand dexterity, gait abnormalities.  The symptoms have been going on for the past several years.  In addition to that, she also had noted debilitating right upper extremity weakness and pain.  Pain was in the right C6 dermatomal distribution.  She was noticing weakness with the right deltoid, right biceps, right wrist extensor.  We discussed extensively her cervical MRI which demonstrated central canal stenosis C4-C5, C5-C6 with severe bilateral neuroforaminal stenosis and myelomalacia..  We did discuss her cervical myelopathy with cervical radiculopathy at length.  We discussed at length the role of surgical invention in the setting of cervical myelopathy is to hopefully prevent decline in symptoms.  We discussed the  Regarding: CRISTA Mark fell off her Scooter, bloody nose and some contusions  ----- Message from Marga Barba sent at 2/12/2023  5:06 PM CST -----  Patient Name: Liyah Iverson    Specialist or PCP Name: Barry Pierce MD    Symptoms: CRISTA Mark fell off her Scooter, bloody nose and some contusions     Pregnant (females aged 13-60. If Yes, how long?) : N/A    Call Back # : [phone]    Which State are you currently located in?: [place]    Name of Clinic Site / Acct# : [address]    Use following scripting for patients waiting for a callback:   \"Nurse callback times vary based on call volumes; please be aware the return phone call may come from an unidentified phone number. If your symptoms worsen or become life threatening while waiting, you should seek immediate assistance by calling 911 or going to the ER for evaluation.\"     purpose of surgical intervention in the setting of cervical radiculopathy is hopefully alleviation of symptoms.  Surgical plan is C4-C5, C5-C6 ACDF.  Risk benefits of surgical versus nonoperative management were discussed extensively.    Of note preoperative holding, the patient had 3 out of 5 strength in the right bicep, 3 out of 5 strength in the right deltoid, 3 out of 5 strength in the right wrist extensor      DESCRIPTION OF PROCEDURE: Ame was marked identified in the preoperative holding area.  Risks of nonoperative versus operative treatment were discussed extensively.  Consent was obtained.  After signing consent, she was taken to the operating room today, 7/27/2023.  She was taken to the OR 10 at [facility].  She was identified as Ame Gipson, date of birth 1965, MRN #[id number].  Patient was transition from hospital bed to a supine Bijan table.  She was placed under general anesthetic without complication. Venodyne boots were placed for DVT prophylaxis.  Neuro monitoring technician attached upper and lower extremity leads.  Raygoza catheter was placed.  Was carefully positioned prone on the spine table.  Postintubation monitoring baselines were taken.  Inflatable IV pump was placed between the shoulders.  Head was cradled utilizing a gel doughnut.  IV pressure bag was inflated to place the patient in cervical lordosis.  The shoulders were gently taped towards the feet.  The arms were tucked and well-padded.  Motors were tested, his baselines were stable compared to postintubation baselines.  I then used fluoroscopic imaging to elida my incision.  Incision was made with natural skin crease, towards the C5 vertebral body.  Incision was marked on the left anterior paracervical region, medial to the left SCM, lateral to the midline of the neck.  The patient was then prepped and draped in usual sterile fashion.  Antibiotics were started 30 minutes prior to incision.    Presurgical safety  timeout was initiated and completed.  15 blade scalpel was then utilized to create a left transverse incision along Melvi Saúl lines, approximately 3 cm.  Subcutaneous bleeding was cauterized.  I then continued my section, splitting the platysma.  Metzenbaum scissors were then utilized to find the natural plane medial to the SCM.  I was then able to visualize and palpate the carotid artery.  This was maintained laterally.  I then continued my dissection medially towards the anterior cervical spine utilizing combination of Kitners and Jhony's.  The esophagus and trachea were retracted medially.  I could palpate the carotid artery laterally.  Self-retaining retractor was placed.  Then spinal needle was then placed into what I presumed was the C5-C6 disc base.  Lateral fluoroscopic imaging was taken which demonstrated needle within the C5-C6 disc space.  S I then continued my exposure utilizing electrocautery to subperiosteally elevate the longus coli and anterior longitudinal ligament.  I then had exposed the C4-5 disc and the C5-6 disc.  Rongeur was utilized to remove the C5 osteophyte.  Fresh 15 blade scalpel was utilized to create annulotomy at C5-C6 disc.  Disc was removed utilizing combination pituitary, Kerrisons.  I then used a combination of curettes to sequentially remove the C5-C6 disc, identifying the medial borders of the uncovertebral joints.  I continue to work posteriorly towards the posterior osteophyte.  High-speed bur was then utilized to thin down the posterior osteophytes until the PLL and disc was visualized.  Combination of nerve hook and small curette was utilized to split the PLL.  Combination of #1 #2 Kerrisons were utilized to excise the PLL and disc.  I was then able to visualize thecal sac and C6 nerve roots.  Nerve hook could freely pass along the C5-C6 neuroforamen.  There is healthy endplate bleeding of the C5-C6 disc space.  Trial implants were utilized.  The small footprint, 6 mm  height, 7 degree lordosis implant had a good press-fit.  I elected this is my final implant.  Implant was packed with Vivigen.  Final implant was then placed in the C5-C6 disc space under direct visualization.  This had excellent press-fit.  Lateral fluoroscopic imaging was taken showing hardware in appropriate position.  I then continued cephalad with C4-C5 disc.  Continued annulotomy and discectomy in similar fashion as the C5-C6 disc.  Medial borders of the uncovertebral joint were visualized.  Posterior osteophytes removed utilizing high-speed bur.  PLL was  utilizing combination of nerve hook and small curette.  This was incised utilizing combination of 1 #2 Kerrisons.  Thecal sac and nerve roots could be visualized.  There is healthy endplate bleeding at the C4-C5 disc space.  I then trialed implant sizes.  I elected 6 mm height, 7 degree lordosis, small footprint.  This had excellent press-fit.  I chose this for my final implant size.  This was also packed with Vivigen.  Final implant was placed under direct visualization as well as fluoroscopic imaging.  I then turned my attention to plate placement.  A 3 segment DePuy uni plate was utilized.  I utilized a 26 mm, 3 segment plate.  Lordosis was placed on the plate.  The C5 vertebral body screw was drilled.  A 14 mm screw was then placed within the C5 vertebral body.  I continued this in a similar fashion for the C4 vertebral body screw and the C6 vertebral body screw.  Lateral x-ray was then taken showing screw trajectory.  All 3 screws were then final tightened and cam locked.  The superior aspect of the C4 vertebral bodies and the inferior aspect of the C6 vertebral body ALL/soft tissue was maintained.  Plate was flush against the soft tissue of the C4 and C6 vertebral body under direct visualization.  Self-retaining retractor was then removed.  Final AP and lateral fluoroscopic x-rays were taken.  Once I was satisfied with the imaging, the wound  was copiously irrigated.  The wound was investigated and there was no bleeding noted in the wound.    I then commenced with closure, utilizing 2-0 Polysorb for the platysma, 3-0 Polysorb for the subcutaneous tissue, followed by the equivalent of Monocryl and Dermabond for skin closure.  Counts were correct at the end of the case there is a dry sterile dressing placed.  Hard cervical collar was placed.  Patient was then extubated and taken the PACU in stable condition.    As the responsible surgeon I attest that I was present during the critical portions of the above procedure.    Diego Curran was essential first assistant needed due to the fact there is no available qualified resident.  His role was to assist in surgical exposure, retraction, maintenance of hemostatically dry operative field, as well as closure.

## 2023-07-26 NOTE — DISCHARGE INSTRUCTIONS
"     [facility] [address] [facility], [address]  [place] [address]   [phone]    CERVICAL SPINE DISCHARGE INSTRUCTIONS   Surgeon: Dr. Milton Acevedo DO         You had the following procedure performed at [facility] on 7/26/2023    Procedure: Procedure(s):  C4-C6 ACDF       Please call to schedule your follow up appointment with  at [phone].   Return in 2 weeks for wound check and routine post op visit .    Please arrive to [facility] 1 hour prior to your scheduled office visit with Dr. Acevedo to have an X-ray of your cervical spine performed.  A prescription will be given to you for this before you are discharged from the hospital.  Go to outpatient radiology to have the X-ray done.  No appointment or special insurance imaging precertification/preauthorization is needed; it is \"walk-in\".  Dr. Acevedo will be able to view the films during your office visit over the radiology intranet.     Neck Precautions:  Please read the following regarding physical activity the first few weeks after surgery:     You may be out of bed walking as much as you feel able.   For ACDF's, The collar must be worn at all times except when eating, drinking, and taking medications until your follow up appointment with Dr. Acevedo.         For Posterior Cervical Fusions- The Collar should be worn ONLY when travelling        by vehicle.  No bending from the waist.  If you must bend, squat to  objects while keeping your neck straight.   Limit lifting too less than 10 pounds.  Do not twist when you lift and carry; instead pivot with your feet.s   No exercises, unless prescribed by Dr. Acevedo, are to be done until your first office visit.  This is to allow your neck muscles time to heal.   You may experience a sore throat.   You may travel home as a passenger.  Nor further automobile travel until your return office visit.   No tub " baths.    Wound Care-    Anterior Cervical Fusion- Remove Dressing on Post-op Day 3,  If there is visible drainage then re-cover with dry sterile dressing x 1 day then remove.  If free of drainage, then ok to shower. Leave incision open to air, no add'l dressings required.                  9.  No NSAIDs i.e. Advil, Ibuprofen, Motrin, Celebrex, etc. for 3 months.  10. No sexual relations until your follow up appointment.   11. No work or school until cleared by Dr. Acevedo.     Please call Dr. Acevedo if you have any of the following signs and symptoms:  Any increased pain in your neck or arms; pain that is different from pain before surgery.  Any pain unrelieved by rest and/or pain medication.   Any opening, drainage, redness or swelling at your incision site.   Any increase in body temperature over 100.5°   Helpful Hints:  If you become tired or experience discomfort, go to bed immediately.   Pain similar t your pre-operative pain is not uncommon and generally resolves in the weeks following your surgery.   Upon your return office visit, Dr. Acevedo will direct your further care and any necessary rehabilitation program.   Take a mild laxative while taking narcotics.      If you have any further question or problems, please do not hesitate to call Diego Valles PA-C at [phone].    ***ISABEL Hose stockings were applied to your legs after surgery.  Wear the stockings until follow up in the office.  OK to remove for hygiene      Acute Post-Operative Pain Management   In accordance with the Center for Disease Control and the National [place] of Health, Dr. Acevedo will be limiting prescription refills for post-operative pain medication to 1 Refill.     It is important that you count your remaining pills and utilize the lowest effective dose of pain medication.      Pain Medications: You were given a combination of acetaminophen, a muscle relaxer, and a narcotic medication.  The acetaminophen should be taken on a  scheduled basis as prescribed to help control your baseline pain.  Do not exceed more than 4,000mg of acetaminophen per day! The narcotic medication should be taken for severe pain only, and you should wean off of this as soon as possible given its addictive potential.  If a refill is necessary, call before running out.  Medications cannot be refilled on weekends/holidays.  No Medication refills after 2pm on Friday's    What this means for you:   In most cases, it is safe to abruptly stop post-operative pain medication without tapering down.   Physical Dependence, including symptoms of withdrawal, does not mean you are addicted it may just mean your body has become used to the medicine and needs time to adjust.          You will need to establish care with a pain management Doctor IF you are running low on pain medication AND have already received a refill after surgery.            The chart below shows an example of a tapering regimen. Here, a patient who is taking ten tablets per day (2 every 4 hours) reduces use by lowering one tablet every 3 to 4 days until he or she is down to five doses per day or less(one tablet every 4 hours).            POTENTIAL SIDE EFFECTS OF OPIOIDS     Tolerance (taking more of the medication for the same pain relief)     Physical dependence (symptoms of withdrawal when the medication is stopped)     Increased sensitivity to pain     Constipation     Nausea, vomiting, and dry mouth     Sleepiness and dizziness     Confusion     Depression     Low levels of testosterone that can result in lower sex drive, energy, and strength     Itching and sweating       If you are Given a Muscle Relaxer  It is safe to use both Pain Medication and a Muscle Relaxer together, however:  It is important to Identify the Type of Pain you experience to help guide you in taking the right drug  Use Muscle Relaxer for Pain in the Neck/Back/Buttock that feels like Tightness/Stiffness/Spasm/Broad Aching Pain  Use  Pain Medication for Primarily Incisional Pain  It is OK to take ONLY the Pain Medication or Only the Muscle Relaxer         How To Use an Incentive Spirometer    Use the Incentive Spirometer 10 times an hour.  This can be broken up into 3 times every 15 minutes or so.   If you develop a fever, take Tylenol as directed.    CONTINUE use of Incentive Spirometer for a full day after the fever.  This will promote clearing the lungs which in turn will prevent fever/chills.    An incentive spirometer is a tool that measures how well you are filling your lungs with each breath. Learning to take long, deep breaths using this tool can help you keep your lungs clear and active. This may help to reverse or lessen your chance of developing breathing (pulmonary) problems, especially infection.       What are the risks?  Breathing too quickly may cause dizziness or cause you to pass out. Take your time so you do not get dizzy or light-headed.  If you are in pain, you may need to take pain medicine before doing incentive spirometry. It is harder to take a deep breath if you are having pain.  How to use your incentive spirometer    Sit up on the edge of your bed or on a chair.  Hold the incentive spirometer so that it is in an upright position.  Before you use the spirometer, breathe out normally.  Place the mouthpiece in your mouth. Make sure your lips are closed tightly around it.  Breathe in slowly and as deeply as you can through your mouth, causing the piston or the ball to rise toward the top of the chamber.  Hold your breath for 3-5 seconds, or for as long as possible.  On the Far RIGHT is a  indicator, use this to guide you in breathing. Keep the blue ball in between the arrows to make sure you injale properly Slow down your breathing if the indicator goes above the marked areas.  Remove the mouthpiece from your mouth and breathe out normally. The piston or ball will return to the bottom of the chamber.  Rest for a few  seconds, then repeat the steps 10 or more times.  Take your time and take a few normal breaths between deep breaths so that you do not get dizzy or light-headed.  Do this every 1 hours when you are awake.  If the spirometer includes a goal marker to show the highest number you have reached (best effort), use this as a goal to work toward during each repetition.  After each set of 10 deep breaths, cough a few times. This will help to make sure that your lungs are clear.  If you have an incision on your chest or abdomen from surgery, place a pillow or a rolled-up towel firmly against the incision when you cough. This can help to reduce pain from coughing.  General tips  When you become able to get out of bed, walk around often and continue to cough to help clear your lungs.  Keep using the incentive spirometer until your health care provider says it is okay to stop using it. If you have been in the hospital, you may be told to keep using the spirometer at home.  Contact a health care provider if:  You have a fever.  You develop shortness of breath.  Get help right away if:  You develop a cough with bloody mucus from the lungs (bloody sputum).  You have fluid or blood coming from an incision site after you cough.  Summary  An incentive spirometer is a tool that can help you learn to take long, deep breaths to keep your lungs clear and active.  You may be asked to use a spirometer after a surgery, if you have a lung problem or a history of smoking, or if you have been inactive for a long period of time.  Use your incentive spirometer as instructed every 1 hours while you are awake.  If you have an incision on your chest or abdomen, place a pillow or a rolled-up towel firmly against your incision when you cough. This will help to reduce pain.  This information is not intended to replace advice given to you by your health care provider. Make sure you discuss any questions you have with your health care provider.  Document  Released: 04/29/2008 Document Revised: 01/10/2019 Document Reviewed: 10/31/2018  Elsevier Interactive Patient Education © 2019 [employer]

## 2023-07-26 NOTE — BRIEF OP NOTE
C4-C6 ACDF (B) Procedure Note    Procedure:    C4-C6 ACDF  CPT(R) Code:  14789 - ARTHRD ANT NTRBDY CERVICAL      Pre-op Diagnosis     * Cervical myelopathy (CMS/HCC) [G95.9]     * Cervical radiculopathy [M54.12]       Post-op Diagnosis     * Cervical myelopathy (CMS/HCC) [G95.9]     * Cervical radiculopathy [M54.12]    Surgeon(s) and Role:     * Milton Acevedo DO - Primary     * Diego Valles PA C - Assisting    Anesthesia: General    Staff:   Circulator: Gage López RN  Scrub Person: Mark Hyde; Saundra Parsons RN    Procedure Details   See dictation    Estimated Blood Loss: 10 mL    Specimens:                Order Name Source Comment Collection Info Order Time   REPEAT ABO/RH (TYPE CHECK) Blood, Venous  Collected By: Swetha Parsons RN 7/26/2023  6:12 AM         Drains:   Urethral Catheter Latex;Straight-tip 16 Fr (Active)   Urine Characteristics clear yellow 07/26/23 1137   Securement Method Securing Device 07/26/23 1137   Output (mL) 475 mL 07/26/23 1400   Net Urine Output (mL) 475 mL 07/26/23 1400       Implants:   Implant Name Type Inv. Item Serial No.  Lot No. LRB No. Used Action   MATRIX BONE CELLULAR VIVIGEN 1C - [id number] - [id number] Human tissue implants MATRIX BONE CELLULAR VIVIGEN 1CC [id number] [facility]  N/A 1 Implanted   4Web spacer 7 degree, [id number], 6mm Spinal interbody cage   4WEB  N/A 2 Implanted   PLATE TWO LEVEL 26MM UNIPLATE - [id number] Plate PLATE TWO LEVEL 26MM UNIPLATE  BONILLA  N/A 1 Implanted   DRILL 14MM UNIPLATE - [id number]  DRILL 14MM UNIPLATE  BONILLA  N/A 1 Implanted and Explanted   UNIPLATE SELF DRILLING SCREW 28MM Screw   DEPUY SPINE  N/A 3 Implanted              Complications:  None; patient tolerated the procedure well.           Disposition: PACU - hemodynamically stable.           Condition: stable    Milton Acevedo DO  Phone Number: [phone]

## 2023-07-26 NOTE — HOSPITAL COURSE
Ame is a 57 y.o. female admitted on 7/26/2023 with Cervical myelopathy (CMS/[facility]) [G95.9]  Cervical radiculopathy [M54.12]. Principal problem is Cervical radiculopathy.    Past Medical History  Ame has a past medical history of Lipid disorder.    History of Present Illness   has bilateral neck/shoulder pain, numbness to her right face and entire right arm, paresthesias at times in her left arm, weak  bilaterally, gait change/stumbles, weak legs and feet, and constant overall pain. Now s/p C4 to 6 ACDF, collar at all times, hx of falls.

## 2023-07-26 NOTE — PLAN OF CARE
Received Pt from PACU. Pt was aaox4 able to make the needs known, complied with admission process. IV ABT was administered as ordered. Neck collar was applied at all time. Surgical dressing dry and intact. Raygoza was D/C at 1700. Pt voided at 1800. OOB with a walker with supervision.  Call bell in reach and safety maintained.

## 2023-07-26 NOTE — OR SURGEON
I have personally marked and identified that patient.  I have confirmed the procedure with the patient.      Milton Acevedo  [phone]

## 2023-07-26 NOTE — ANESTHESIA POSTPROCEDURE EVALUATION
Patient: Ame Gipson    Procedure Summary     Date: 07/26/23 Room / [facility] OR 10 / [facility]    Anesthesia Start: 0706 Anesthesia Stop: 1123    Procedure: C4-C6 ACDF (Bilateral: Spine Cervical) Diagnosis:       Cervical myelopathy (CMS/HCC)      Cervical radiculopathy      (Cervical myelopathy (CMS/HCC) [G95.9])      (Cervical radiculopathy [M54.12])    Surgeons: Milton Acevedo DO Responsible Provider: Fer Thompson MD    Anesthesia Type: general ASA Status: 2          Anesthesia Type: general  PACU Vitals  7/26/2023 1114 - 7/26/2023 1214      7/26/2023  1122 7/26/2023  1145 7/26/2023  1156 7/26/2023  1200    BP: 169/80 159/91 159/91 148/76    Temp: 35.9 °C (96.7 °F) -- -- --    Pulse: 104 108 114 108    Resp: 20 11 24 13    SpO2: 99 % -- -- 96 %            Anesthesia Post Evaluation    Pain management: adequate  Patient location during evaluation: PACU  Patient participation: complete - patient participated  Level of consciousness: awake and alert  Cardiovascular status: acceptable  Airway Patency: adequate  Respiratory status: acceptable  Hydration status: acceptable  Anesthetic complications: no

## 2023-07-26 NOTE — PROGRESS NOTES
"[facility] PROGRESS NOTE      Patient was seen and examined.  Doing well post operatively.  Improved subjective strength in the RUE compared to pre op.       PHYSICAL EXAM :     Visit Vitals  BP (!) 146/69 (BP Location: Right upper arm, Patient Position: Lying)   Pulse (!) 107   Temp 36.4 °C (97.6 °F) (Axillary)   Resp 20   Ht 1.702 m (5' 7\")   Wt 93.9 kg (207 lb)   SpO2 93%   BMI 32.42 kg/m²          Extremity Motor Exam:     RUE: Deltoid 4+/5 , Biceps 4+/5 , Wrist Extension 4+/5 , Triceps 4+/5 , Wrist flexors 5/5, Hand  4+/5, Interossi 5/5.     LUE: Deltoid 5/5 , Biceps 5/5 , Wrist Extension 5/5 , Triceps 5/5 , Wrist flexors 5/5, Hand  5/5, Interossi 5/5.       Sensory Exam:     Baseline numbness in the RUE - C6 distribution    SILT LUE    Vascular exam:     Right - palpable radial pulse    Left - palpable radial pulse    A/P: POD 0 s/p C4-C6 ACDF    -Abx 24 Hours  -Decadron x 24 hours  -Head of bed 30 degrees  -Cervical collar  -SCDs/TEDs for DVT ppx  -PT/OT  -XR prior to discharge  -Pain control  -May DC when drain removed, medically stable, meets PT goals, pain controlled    Milton Acevedo,   7/26/2023  4:33 PM    "

## 2023-07-27 ENCOUNTER — APPOINTMENT (OUTPATIENT)
Dept: RADIOLOGY | Facility: HOSPITAL | Age: 58
End: 2023-07-27
Attending: PHYSICIAN ASSISTANT
Payer: COMMERCIAL

## 2023-07-27 VITALS
DIASTOLIC BLOOD PRESSURE: 66 MMHG | SYSTOLIC BLOOD PRESSURE: 138 MMHG | OXYGEN SATURATION: 97 % | HEART RATE: 88 BPM | BODY MASS INDEX: 32.49 KG/M2 | RESPIRATION RATE: 18 BRPM | HEIGHT: 67 IN | TEMPERATURE: 98 F | WEIGHT: 207 LBS

## 2023-07-27 LAB
ANION GAP SERPL CALC-SCNC: 9 MEQ/L (ref 3–15)
BUN SERPL-MCNC: 14 MG/DL (ref 7–25)
CALCIUM SERPL-MCNC: 9.4 MG/DL (ref 8.6–10.3)
CHLORIDE SERPL-SCNC: 102 MEQ/L (ref 98–107)
CO2 SERPL-SCNC: 26 MEQ/L (ref 21–31)
CREAT SERPL-MCNC: 0.8 MG/DL (ref 0.6–1.2)
ERYTHROCYTE [DISTWIDTH] IN BLOOD BY AUTOMATED COUNT: 11.6 % (ref 11.7–14.4)
GFR SERPL CREATININE-BSD FRML MDRD: >60 ML/MIN/1.73M*2
GLUCOSE SERPL-MCNC: 147 MG/DL (ref 70–99)
HCT VFR BLDCO AUTO: 39.6 % (ref 35–45)
HGB BLD-MCNC: 13.5 G/DL (ref 11.8–15.7)
MCH RBC QN AUTO: 30.5 PG (ref 28–33.2)
MCHC RBC AUTO-ENTMCNC: 34.1 G/DL (ref 32.2–35.5)
MCV RBC AUTO: 89.6 FL (ref 83–98)
PDW BLD AUTO: 9.9 FL (ref 9.4–12.3)
PLATELET # BLD AUTO: 186 K/UL (ref 150–369)
POTASSIUM SERPL-SCNC: 4.2 MEQ/L (ref 3.5–5.1)
RBC # BLD AUTO: 4.42 M/UL (ref 3.93–5.22)
SODIUM SERPL-SCNC: 137 MEQ/L (ref 136–145)
WBC # BLD AUTO: 13.62 K/UL (ref 3.8–10.5)

## 2023-07-27 PROCEDURE — 97535 SELF CARE MNGMENT TRAINING: CPT | Mod: GO

## 2023-07-27 PROCEDURE — 97530 THERAPEUTIC ACTIVITIES: CPT | Mod: GO

## 2023-07-27 PROCEDURE — 80048 BASIC METABOLIC PNL TOTAL CA: CPT | Performed by: PHYSICIAN ASSISTANT

## 2023-07-27 PROCEDURE — 85027 COMPLETE CBC AUTOMATED: CPT | Performed by: PHYSICIAN ASSISTANT

## 2023-07-27 PROCEDURE — 25800000 HC PHARMACY IV SOLUTIONS: Performed by: PHYSICIAN ASSISTANT

## 2023-07-27 PROCEDURE — 63700000 HC SELF-ADMINISTRABLE DRUG: Performed by: PHYSICIAN ASSISTANT

## 2023-07-27 PROCEDURE — 36415 COLL VENOUS BLD VENIPUNCTURE: CPT | Performed by: PHYSICIAN ASSISTANT

## 2023-07-27 PROCEDURE — 97166 OT EVAL MOD COMPLEX 45 MIN: CPT | Mod: GO

## 2023-07-27 PROCEDURE — 63600000 HC DRUGS/DETAIL CODE: Mod: JW | Performed by: PHYSICIAN ASSISTANT

## 2023-07-27 PROCEDURE — 97116 GAIT TRAINING THERAPY: CPT | Mod: GP,CQ

## 2023-07-27 PROCEDURE — 72040 X-RAY EXAM NECK SPINE 2-3 VW: CPT

## 2023-07-27 RX ORDER — SENNOSIDES 8.6 MG/1
1 TABLET ORAL DAILY
Qty: 30 TABLET | Refills: 0
Start: 2023-07-27 | End: 2023-08-26

## 2023-07-27 RX ORDER — CYCLOBENZAPRINE HCL 5 MG
10 TABLET ORAL 3 TIMES DAILY PRN
Qty: 15 TABLET | Refills: 0 | Status: SHIPPED | OUTPATIENT
Start: 2023-07-27 | End: 2023-08-11

## 2023-07-27 RX ORDER — OXYCODONE HYDROCHLORIDE 5 MG/1
5-10 TABLET ORAL EVERY 6 HOURS PRN
Qty: 30 TABLET | Refills: 0 | Status: SHIPPED | OUTPATIENT
Start: 2023-07-27 | End: 2023-08-01

## 2023-07-27 RX ORDER — ACETAMINOPHEN 325 MG/1
650 TABLET ORAL EVERY 6 HOURS PRN
Qty: 30 TABLET | Refills: 0
Start: 2023-07-27 | End: 2023-08-06

## 2023-07-27 RX ORDER — POLYETHYLENE GLYCOL 3350 17 G/17G
17 POWDER, FOR SOLUTION ORAL DAILY
Qty: 3 PACKET | Refills: 0
Start: 2023-07-27 | End: 2023-07-30

## 2023-07-27 RX ADMIN — DEXAMETHASONE SODIUM PHOSPHATE 10 MG: 4 INJECTION, SOLUTION INTRA-ARTICULAR; INTRALESIONAL; INTRAMUSCULAR; INTRAVENOUS; SOFT TISSUE at 02:41

## 2023-07-27 RX ADMIN — ACETAMINOPHEN 975 MG: 325 TABLET, FILM COATED ORAL at 06:10

## 2023-07-27 RX ADMIN — SODIUM CHLORIDE: 9 INJECTION, SOLUTION INTRAVENOUS at 08:06

## 2023-07-27 RX ADMIN — CYCLOBENZAPRINE HYDROCHLORIDE 10 MG: 5 TABLET, FILM COATED ORAL at 14:48

## 2023-07-27 RX ADMIN — CYCLOBENZAPRINE HYDROCHLORIDE 10 MG: 5 TABLET, FILM COATED ORAL at 08:05

## 2023-07-27 RX ADMIN — ACETAMINOPHEN 975 MG: 325 TABLET, FILM COATED ORAL at 14:03

## 2023-07-27 RX ADMIN — OXYCODONE HYDROCHLORIDE 5 MG: 5 TABLET ORAL at 02:40

## 2023-07-27 RX ADMIN — DEXAMETHASONE SODIUM PHOSPHATE 10 MG: 4 INJECTION, SOLUTION INTRA-ARTICULAR; INTRALESIONAL; INTRAMUSCULAR; INTRAVENOUS; SOFT TISSUE at 14:03

## 2023-07-27 RX ADMIN — Medication 1 SPRAY: at 02:48

## 2023-07-27 RX ADMIN — POLYETHYLENE GLYCOL 3350 17 G: 17 POWDER, FOR SOLUTION ORAL at 08:05

## 2023-07-27 RX ADMIN — CEFAZOLIN 2 G: 2 INJECTION, POWDER, FOR SOLUTION INTRAMUSCULAR; INTRAVENOUS at 08:05

## 2023-07-27 ASSESSMENT — COGNITIVE AND FUNCTIONAL STATUS - GENERAL
AFFECT: WFL
HELP NEEDED FOR PERSONAL GROOMING: 3 - A LITTLE
AFFECT: WFL
MOVING TO AND FROM BED TO CHAIR: 3 - A LITTLE
STANDING UP FROM CHAIR USING ARMS: 3 - A LITTLE
HELP NEEDED FOR BATHING: 3 - A LITTLE
CLIMB 3 TO 5 STEPS WITH RAILING: 4 - NONE
DRESSING REGULAR LOWER BODY CLOTHING: 3 - A LITTLE
EATING MEALS: 3 - A LITTLE
STANDING UP FROM CHAIR USING ARMS: 4 - NONE
DRESSING REGULAR UPPER BODY CLOTHING: 3 - A LITTLE
CLIMB 3 TO 5 STEPS WITH RAILING: 3 - A LITTLE
TOILETING: 3 - A LITTLE
WALKING IN HOSPITAL ROOM: 3 - A LITTLE
MOVING TO AND FROM BED TO CHAIR: 4 - NONE
WALKING IN HOSPITAL ROOM: 4 - NONE

## 2023-07-27 NOTE — PROGRESS NOTES
"[facility] PROGRESS NOTE      Patient was seen and examined.  In no acute distress.  No signficant events overnight.  +Flatus, +Urination. Pain controlled. Significant improvement of R UE strength today compared to pre op       PHYSICAL EXAM :     Visit Vitals  /68 (BP Location: Right upper arm, Patient Position: Lying)   Pulse 75   Temp 36.7 °C (98 °F) (Oral)   Resp 18   Ht 1.702 m (5' 7\")   Wt 93.9 kg (207 lb)   SpO2 94%   BMI 32.42 kg/m²          Extremity Motor Exam:     RUE: Deltoid 5/5 , Biceps 5/5 , Wrist Extension 5/5 , Triceps 5/5 , Wrist flexors 5/5, Hand  5/5, Interossi 5/5.     LUE: Deltoid 5/5 , Biceps 5/5 , Wrist Extension 5/5 , Triceps 5/5 , Wrist flexors 5/5, Hand  5/5, Interossi 5/5.       Sensory Exam:     Baseline numbness in the Right hand, lateral forearm    Vascular exam:     Right - palpable radial pulse    Left - palpable radial pulse    A/P: POD 1 s/p C4-C6 ACDF    -Abx 24 Hours  -Decadron x 24 hours  -Head of bed 30 degrees  -Cervical collar  -SCDs/TEDs for DVT ppx  -PT/OT  -XR prior to discharge  -Pain control  -May DC when medically stable, meets PT goals, pain controlled    Milton Acevedo,   7/27/2023  8:28 AM    "

## 2023-07-27 NOTE — PROGRESS NOTES
Occupational Therapy -  Initial Evaluation     Patient: Ame Gipson  Location: [place] [address]  MRN: [id number]  Today's date: 7/27/2023    HISTORY OF PRESENT ILLNESS     Ame is a 57 y.o. female admitted on 7/26/2023 with Cervical myelopathy (CMS/HCC) [G95.9]  Cervical radiculopathy [M54.12]. Principal problem is Cervical radiculopathy.    Past Medical History  Ame has a past medical history of Lipid disorder.    History of Present Illness   has bilateral neck/shoulder pain, numbness to her right face and entire right arm, paresthesias at times in her left arm, weak  bilaterally, gait change/stumbles, weak legs and feet, and constant overall pain. Now s/p C4 to 6 ACDF, collar at all times, hx of falls.     PRIOR LEVEL OF FUNCTION AND LIVING ENVIRONMENT     Prior Level of Function    Flowsheet Row Most Recent Value   Dominant Hand right   Ambulation independent   Transferring independent   Toileting independent   Bathing independent   Dressing independent   Eating independent   IADLs assistive person   Driving/Transportation    Assistive Device Currently Used at Home none        Prior Living Environment    Flowsheet Row Most Recent Value   People in Home spouse, child(neetu), dependent   Current Living Arrangements home   Living Environment Comment 2 SH 2 and 1 [place] no rail, FF inside with rail. home built in late 1800's. uneven floors, steep staircase., reports will be staying on 1st floor with pdr room -plan to sponge bathe until can manage uneven floor and go upstairs        Occupational Profile    Flowsheet Row Most Recent Value   Successful Occupations retired , currently working in sales/accounts   Environmental Supports and Barriers reports  can support with all ADLS/IADLS as needed        VITALS AND PAIN     OT Vitals    Date/Time Pulse HR Source SpO2 Pt Activity O2 Therapy BP BP Location BP Method Pt Position Who   07/27/23 0836 71  Monitor 96 % At rest None (Room air) 138/66 Left upper arm Automatic Sitting up in bed SLK   07/27/23 0854 88 Monitor 97 % Walking None (Room air) -- -- -- -- SLK      OT Pain    Date/Time Pain Type Side/Orientation Location Rating: Rest Rating: Activity Description Interventions [facility]   07/27/23 0836 Pain Assessment posterior neck 4 -- aching;cramping position adjusted;relaxation techniques promoted SLK   07/27/23 0854 Pain Reassessment;Post Activity posterior neck -- 6 aching;cramping relaxation techniques promoted SLK        Objective   OBJECTIVE     Start time:  0819  End time:  0857 (pt seen [id number] and then [id number] . 2 session as pt when to xrays between session and required increased time)  Session Length: 38 min  Mode of Treatment: occupational therapy, individual therapy    General Observations  Patient received upright, in bed. She was agreeable to therapy, no issues or concerns identified by nurse prior to session. cervical collar in place --    Precautions: spinal, fall, brace worn at all times (aspen collar)              OT Eval and Treat - 07/27/23 0819        Cognition    Orientation Status oriented x 4     Affect/Mental Status WFL     Follows Commands WFL     Cognitive Function WFL     Comment, Cognition pleasant and cooperative,receptive to education     Cognitive Interventions/Strategies occupation/activity based interventions        Vision Assessment/Intervention    Vision Assessment WFL;corrective lenses for reading        Hearing Assessment    Hearing Status WFL        Sensory Assessment    Sensory Assessment sensation intact except     Sensation Impaired Location(s) right UE     Right UE Sensory Impairment stereognosis;diminished;light touch localization   thenar eminance and radial wrist    Sensory Subjective Reports tingling;paresthesia        Upper Extremity Assessment    UE Assessment ROM and Strength WFL except     General Observations R intrinsics weakness        Motor Skills    Motor  Skills neuro-muscular function     Coordination fine motor deficit;right;upper extremity;minimal impairment;bimanual skills;opposition   in hand manipulation/translation of times    Motor Control/Coordination Interventions fine motor manipulation/dexterity activities;functional task specific training;bilateral/bimanual training;occupation/activity based treatment        Bed Mobility    Bed Mobility Activities supine to sit     [place] modified independence     Safety/Cues increased time to complete     Assistive Device bed rails;head of bed elevated     Comment OOB to L, increased time and use of bed rail        Sit/Stand Transfer    Surface edge of bed     [place] supervision     Safety/Cues increased time to complete;minimal;verbal cues;preparatory posture;hand placement     Assistive Device none        Toilet Transfer    Transfer Technique sit/stand     [place] supervision     Safety/Cues increased time to complete;minimal;verbal cues;hand placement;preparatory posture     Assistive Device grab bars/safety frame   x1 on left       Functional Mobility    Distance in room/bathroom     Functional Mobility [place] supervision     Safety/Cues increased time to complete;minimal;verbal cues;preparatory posture   arm swing for balance       Upper Body Dressing    Tasks pull over garment     [place] supervision     Safety/Cues increased time to complete;minimal;verbal cues     Position unsupported sitting;edge of bed sitting     Comment reports doens't wear any clothing with [place]astern[place] buttons --wears pull over dresses        Lower Body Dressing    Tasks don;socks     [place] supervision     Safety/Cues increased time to complete;minimal;verbal cues;compensatory techniques     Position edge of bed sitting     Comment increased time due to dec FMC, crossed leg method for incorporation of c spine pxn        Grooming    Tasks washes, rinses and dries face;washes, rinses and dries hands      [place] supervision     Safety/Cues increased time to complete;compensatory techniques;minimal;verbal cues     Position sitting up in bed     Comment increased time to open containers, edu on adapted tech with cervical prescautions        Toileting    Comment declined need , reports no concerns        Self-Feeding    Tasks liquids to mouth;scoop food onto utensil;utensil to mouth     [place] modified independence;set up     Safety/Cues increased time to complete     Position sitting up in bed     Setup Assistance prepare tray/open items        Balance    Static Sitting Balance WFL     Dynamic Sitting Balance WFL;unsupported;sitting, edge of bed     Sit to Stand Dynamic Balance WFL;unsupported;standing     Static Standing Balance WFL;unsupported;standing     Dynamic Standing Balance mild impairment;unsupported;standing     Balance Interventions occupation based/functional task        Impairments/Safety Issues    Impairments Affecting Function balance;coordination        Hand (Therapeutic Exercise)    Exercise Position/Type seated;AROM (active range of motion)     General Exercise finger flexion/extension;thumb opposition     Comment edu on therapuetic activites incliuding paper crumple, small object and coijn  with inhand manipulatino and translation and feeding out to slotted contaiinter. +fatigue. PT with good understandign and carryover. Encouraged to engage in bimanula tasks (ie. folding laundry)                        Spinal Orthosis Management  Type (Spinal Orthosis): cervical collar, hard (ASpen)  Fabrication Comment (Spinal Orthosis): Aspen collar  Functional Design (Spinal Orthosis): static orthosis  Therapeutic Indications Spinal Orthosis: post-op positioning/protection  Wearing Schedule (Spinal Orthosis): wear full-time (per orders)  Orthosis Training (Spinal Orthosis): all orthosis skills, patient  Compliance/Wearing Issues (Spinal Orthosis): patient/caregiver comprehend strategies,  patient/caregiver comprehend rationale for orthosis      Education Documentation  Treatment Plan, taught by Ana Lino OT at 7/27/2023 12:22 PM.  Learner: Patient  Readiness: Acceptance  Method: Explanation, Demonstration  Response: Verbalizes Understanding, Demonstrated Understanding  Comment: role/goal of OT, therapy plan of care carvical precautions and use of Aspen collar, adapted ADL/transfer training, UE /FMC exercises, use of call bell        Session Outcome  Patient upright, in bed at end of session, call light in reach, personal items in reach, all needs met. Nursing notified about patient's performance, patient's position, and patient's response to therapy/activity.    AM-PAC™ - ADL (Current Function)     Putting on/taking off regular lower body clothing 3 - A Little   Bathing 3 - A Little   Toileting 3 - A Little   Putting on/taking off regular upper body clothing 3 - A Little   Help for taking care of personal grooming 3 - A Little   Eating meals 3 - A Little   AM-PAC™ ADL Score 18      ASSESSMENT AND PLAN     Progress Summary  OT eval completed. Pt with reports of marked improvement in overall ROM and strength R UE except dec inhand manipulation and intrinsic/oppostional strength,. Pt able to complete all functional activites with increased time and cues for adatped tech. Pt receptive to all edu and anticipate discharge home with famly assist and will benefit from OT hand therapy if weakness continues    Patient/Family Therapy Goal Statement: to have less pain    OT Plan    Flowsheet Row Most Recent Value   Therapy Frequency evaluation only at 07/27/2023 0819          OT Discharge Recommendations    Flowsheet Row Most Recent Value   OT Recommended Discharge Disposition home with assistance, home with outpatient services at 07/27/2023 0819   Anticipated Equipment Needs At Discharge (OT) none at 07/27/2023 0819

## 2023-07-27 NOTE — PLAN OF CARE
Plan of Care Review  Plan of Care Reviewed With: patient  Progress: improving  Outcome Evaluation: Pr received in bed aao x 4. Surgical dressing to posterior neck intact c/d/i. Cervical collar remains on per order. Neuro checks continues. C/o numbness and tingling to RUE and right side of her face. Pt states this sensation is baseline and has improved since surgery.  No airway compromise at this time. Pain managed with prn oxy. Continues to ambulate to bathroom with walker and standby assist. Safety measures in place and call bell within reach.

## 2023-07-27 NOTE — PROGRESS NOTES
Physical Therapy -  Daily Treatment/Progress Note     Patient: Ame Gipson  Location: [place] [address]  MRN: [id number]  Today's date: 7/27/2023    HISTORY OF PRESENT ILLNESS     Ame is a 57 y.o. female admitted on 7/26/2023 with Cervical myelopathy (CMS/HCC) [G95.9]  Cervical radiculopathy [M54.12]. Principal problem is Cervical radiculopathy.    Past Medical History  Ame has a past medical history of Lipid disorder.    History of Present Illness   has bilateral neck/shoulder pain, numbness to her right face and entire right arm, paresthesias at times in her left arm, weak  bilaterally, gait change/stumbles, weak legs and feet, and constant overall pain. Now s/p C4 to 6 ACDF, collar at all times, hx of falls.     PRIOR LEVEL OF FUNCTION AND LIVING ENVIRONMENT     Prior Level of Function    Flowsheet Row Most Recent Value   Dominant Hand right   Ambulation independent   Transferring independent   Toileting independent   Bathing independent   Dressing independent   Eating independent   IADLs assistive person   Driving/Transportation    Assistive Device Currently Used at Home none        Prior Living Environment    Flowsheet Row Most Recent Value   People in Home spouse, child(neetu), dependent   Current Living Arrangements home   Living Environment Comment 2 SH 2 and 1 [place] no rail, FF inside with rail. home built in late 1800's. uneven floors, steep staircase., reports will be staying on 1st floor with pdr room -plan to sponge bathe until can manage uneven floor and go upstairs        VITALS AND PAIN     PT Pain    Date/Time Pain Type Side/Orientation Location Rating: Rest Rating: Activity Interventions [facility]   07/27/23 1314 Pain Assessment generalized neck 4 4 position adjusted [facility]        Objective   OBJECTIVE     Start time:  1314  End time:  1324  Session Length: 10 min  Mode of Treatment: individual therapy, physical therapy    General Observations  Patient  received upright, in bed. She was agreeable to therapy. cervical collar in place.    Precautions: spinal, fall, brace worn at all times        Services  Do You Speak a Language Other Than English at Home?: no      PT Eval and Treat - 07/27/23 1314        Cognition    Affect/Mental Status WFL        Bed Mobility    Bed Mobility Activities supine to sit     [place] independent     Comment out of bed to left side.  No physical assistance required.        Mobility Belt    Mobility Belt Used for All Out of Bed Activity no        Sit/Stand Transfer    Surface edge of bed     [place] independent     Assistive Device none     Transfer Comments pt demonstrates safe tech.        Gait Training    [place], Gait independent     Assistive Device none     Distance in Feet 300 feet     Pattern step-through     Comment (Gait/Stairs) steady gait without AD, no noted deviations.        Stairs Training    [place], Stairs modified independence     Assistive Device railing     Handrail Location (Stairs) both sides     Ascending Stairs Technique step-to-step     Descending Stairs Technique step-to-step     Comment cues for sequencing.        Balance    Static Sitting Balance WFL     Dynamic Sitting Balance WFL     Sit to Stand Dynamic Balance WFL     Static Standing Balance WFL     Dynamic Standing Balance WFL        Lower Extremity (Therapeutic Exercise)    Comment reviewed ankle pumps.                               Education Documentation  Activity, taught by Pablo Muhammad PTA at 7/27/2023  1:36 PM.  Learner: Patient  Readiness: Acceptance  Method: Explanation  Response: Needs Reinforcement  Comment: home walking program.        Session Outcome  Patient upright, in bed at end of session, all needs met. Nursing notified about patient's performance.    AM-PAC™ - Mobility (Current Function)     Turning form your back to your side while in flat bed without using bedrails 4 - None   Moving from lying  on your back to sitting on the side of a flat bed without using bedrails 4 - None   Moving to and from a bed to a chair 4 - None   Standing up from a chair using your arms 4 - None   To walk in a hospital room 4 - None   Climbing 3-5 steps with a railing 4 - None   AM-PAC™ Mobility Score 24      ASSESSMENT AND PLAN     Progress Summary  Pt demonstrates safe bed mobility, STS transfers, ambulation without AD and stair navigation..  No episodes of loss of balance.    Patient/Family Therapy Goals Statement: return home    PT Plan    Flowsheet Row Most Recent Value   Rehab Potential good, to achieve stated therapy goals at 07/27/2023 1314   Therapy Frequency daily at 07/27/2023 1314   Planned Therapy Interventions balance training, bed mobility training, gait training, patient/family education, stair training, transfer training at 07/26/2023 1331          PT Discharge Recommendations    Flowsheet Row Most Recent Value   PT Recommended Discharge Disposition home with assistance at 07/27/2023 1314   Anticipated Equipment Needs at Discharge (PT) none at 07/27/2023 1314               PT Goals    Flowsheet Row Most Recent Value   Bed Mobility Goal 1    Activity/Assistive Device bed mobility activities, all at 07/26/2023 1331   [place] modified independence at 07/26/2023 1331   Time Frame 3-5 days at 07/26/2023 1331   Progress/Outcome goal ongoing at 07/26/2023 1331   Transfer Goal 1    Activity/Assistive Device all transfers at 07/26/2023 1331   [place] modified independence at 07/26/2023 1331   Time Frame 3-5 days at 07/26/2023 1331   Progress/Outcome goal ongoing at 07/26/2023 1331   Gait Training Goal 1    Activity/Assistive Device gait (walking locomotion) at 07/26/2023 1331   [place] modified independence at 07/26/2023 1331   Distance 250 at 07/26/2023 1331   Time Frame 3-5 days at 07/26/2023 1331   Progress/Outcome goal ongoing at 07/26/2023 1331   Stairs Goal 1    Activity/Assistive Device stairs, all  skills at 07/26/2023 1331   [place] supervision required at 07/26/2023 1331   Number of Stairs 12 at 07/26/2023 1331   Time Frame 3-5 days at 07/26/2023 1331   Progress/Outcome goal ongoing at 07/26/2023 1331

## 2023-07-27 NOTE — PLAN OF CARE
Care Coordination Admission Assessment Note    General Information:  Readmission Within the last 30 days: no previous admission in last 30 days  Does patient have a :    Patient-Specific Goals (include timeframe): feel better and discharge home    Living Arrangements:  Arrived From: home  Current Living Arrangements: home  People in Home: spouse, child(neetu), dependent  Home Accessibility:    Living Arrangement Comments: patient resides in 2SH 3STE w/ her spouse and son    Housing Stability and Financial Resources (SDOH):  In the last 12 months, was there a time when you were not able to pay the mortgage or rent on time?:    In the last 12 months, how many places have you lived?:    In the last 12 months, was there a time when you did not have a steady place to sleep or slept in a shelter (including now)?: No  How hard is it for you to pay for the very basics like food, housing, medical care, and heating?: Not very hard    Functional Status Prior to Admission:   Assistive Device/Animal Currently Used at Home: none  Functional Status Comments: prior to hospitalization patient was IND in ADLs using no assistive devices to ambulate  IADL Comments:       Supports and Services:  Current Outpatient/Agency/Support Group: none  Type of Current Home Care Services:    History of home care episode or rehab stay: no prev HHC or rehab stay endorsed    Discharge Needs Assessment:   Concerns to be Addressed: discharge planning  Current Discharge Risk:    Anticipated Changes Related to Illness: none    Patient/Family Anticipated Discharge Plan:  Patient/Family Anticipates Transition To: home with family  Patient/Family Anticipated Services at Transition: rehabilitation services, durable medical equipment    Connection to Community       Patient Choice:   Offered/Gave Vendor List:    Patient's Choice of Community Agency(s):         Anticipated Discharge Plan:  Met with patient. Provided education and contact  information for Care Coordination services.: yes  Anticipated Discharge Disposition: home with outpatient services  Type of Outpatient Services: physical therapy      Transportation Needs (SDOH):  Transportation Concerns: none  Transportation Anticipated: family or friend will provide  Is Out of Hospital DNR needed at discharge?:      In the past 12 months, has lack of transportation kept you from medical appointments or from getting medications?: No  In the past 12 months, has lack of transportation kept you from meetings, work, or from getting things needed for daily living?: No    Concerns - comments: JESSICA assessment  completed /w patient's spouse. Confirmed demographics, PCP, pharmacy, EC, patient: POD 1 s/p C4-C6 ACDF. Pt/OT to assist w/ dispo planning. rec: home w/ outpatient services provided w/ RW. patient's son to transport. no anticipated dispo needs. CM will follow for discharge planning needs until d/c completed.

## 2023-07-27 NOTE — PLAN OF CARE
Problem: Adult Inpatient Plan of Care  Goal: Plan of Care Review  Outcome: Progressing  Flowsheets (Taken 7/27/2023 1220)  Progress: improving  Outcome Evaluation: OT eval cpmpleted. REC home with family assist for IADLS as needed.  Plan of Care Reviewed With: patient     Problem: Spinal Surgery  Goal: Optimal Functional Ability  Outcome: Progressing   Adapted Adl/transfer training  UE/FMC exercises

## 2023-08-07 RX ORDER — METHYLPREDNISOLONE 4 MG/1
TABLET ORAL
Qty: 42 TABLET | Refills: 0 | Status: SHIPPED | OUTPATIENT
Start: 2023-08-07

## 2023-08-10 DIAGNOSIS — M54.12 CERVICAL RADICULOPATHY: Primary | ICD-10-CM

## 2023-08-11 ENCOUNTER — HOSPITAL ENCOUNTER (OUTPATIENT)
Dept: RADIOLOGY | Facility: HOSPITAL | Age: 58
Discharge: HOME | End: 2023-08-11
Attending: ORTHOPAEDIC SURGERY
Payer: COMMERCIAL

## 2023-08-11 ENCOUNTER — OFFICE VISIT (OUTPATIENT)
Dept: ORTHOPEDICS | Facility: CLINIC | Age: 58
End: 2023-08-11
Payer: COMMERCIAL

## 2023-08-11 DIAGNOSIS — Z98.1 S/P CERVICAL SPINAL FUSION: Primary | ICD-10-CM

## 2023-08-11 DIAGNOSIS — M54.12 CERVICAL RADICULOPATHY: ICD-10-CM

## 2023-08-11 PROCEDURE — 99024 POSTOP FOLLOW-UP VISIT: CPT | Performed by: STUDENT IN AN ORGANIZED HEALTH CARE EDUCATION/TRAINING PROGRAM

## 2023-08-11 PROCEDURE — 72040 X-RAY EXAM NECK SPINE 2-3 VW: CPT

## 2023-08-11 RX ORDER — CYCLOBENZAPRINE HCL 5 MG
10 TABLET ORAL 3 TIMES DAILY PRN
Qty: 15 TABLET | Refills: 0 | Status: SHIPPED | OUTPATIENT
Start: 2023-08-11 | End: 2023-08-31 | Stop reason: SDUPTHER

## 2023-08-11 RX ORDER — GABAPENTIN 100 MG/1
100 CAPSULE ORAL NIGHTLY
Qty: 30 CAPSULE | Refills: 0 | Status: SHIPPED | OUTPATIENT
Start: 2023-08-11 | End: 2023-08-31 | Stop reason: SDUPTHER

## 2023-08-11 NOTE — PROGRESS NOTES
[facility] POST OP FOLLOW UP    SUBJECTIVE:     2 weeks status post C4-C5, C5-C6 ACDF.  Patient states she is experiencing return of lateral/dorsal lateral forearm pain on the right side that she had preoperatively.  This occurred 3 days postoperatively.  She is also experiencing 2 out of 10 left-sided lateral forearm/dorsal lateral forearm pain.      The preoperative pain she was having in the right deltoid, right bicep, right thumb and index finger has substantially improved.  She notes no right or left tricipital pain.  No right or left chest wall pain.    She has overall improvement of right biceps and right deltoid range of motion and strength compared to preoperative levels.    She also states her overall balance, leg heaviness, urinary urgency has improved postoperatively.    She is currently on day 4 of Medrol Dosepak.  We are refilling her her Flexeril and we will start her on gabapentin.  This will be confirmed with Dr. Lopez with her history of trazodone and amitriptyline usage.      There were no vitals filed for this visit.    Exam:    RUE: Deltoid 5/5 , Bicep 5/5, Wrist extensors 5/5, Triceps 5/5 , Wrist flexors 5/5, Interossi 5/5 . C5-T1 SILT, +RAD PULSE    LUE: Deltoid 5/5 , Bicep 5/5, Wrist extensors 5/5, Triceps 5/5 , Wrist flexors 5/5, Interossi 5/5 . C5-T1 SILT , +RAD PULSE    Incision clean, dry, intact    Imaging: Postop x-rays show stable hardware position.    A/P:     -We discussed at length it will likely take several months, up to a year and a half to see the maximal benefit after surgical intervention.  She is already noticing subtle improvements from a cervical myelopathy perspective and a cervical radiculopathy perspective.  We did discuss she could see potential continued improvements in the lateral, dorsal lateral forearm pain.    -We did discuss at length that the purpose of surgical intervention in her setting was hopefully preventing stepwise decline of her  cervical myelopathy.    -She is currently on day 4 of Medrol Dosepak.  We are refilling her her Flexeril and we will start her on gabapentin.  This will be confirmed with Dr. Lopez with her history of trazodone and amitriptyline usage.    -She can start to wean from the hard cervical collar, we can discuss transitioning to a soft collar.    -She is aware I will be departing [facility] after August 31, 2023.  She would like to follow-up with me August 31, 2023 at [place].  We discussed transition of care.          Milton Acevedo,   8/11/2023  11:11 AM

## 2023-08-30 DIAGNOSIS — Z98.1 S/P CERVICAL SPINAL FUSION: Primary | ICD-10-CM

## 2023-08-31 ENCOUNTER — OFFICE VISIT (OUTPATIENT)
Dept: ORTHOPEDICS | Facility: CLINIC | Age: 58
End: 2023-08-31
Payer: COMMERCIAL

## 2023-08-31 ENCOUNTER — HOSPITAL ENCOUNTER (OUTPATIENT)
Dept: RADIOLOGY | Facility: CLINIC | Age: 58
Discharge: HOME | End: 2023-08-31
Attending: ORTHOPAEDIC SURGERY
Payer: COMMERCIAL

## 2023-08-31 DIAGNOSIS — M54.12 CERVICAL RADICULOPATHY: Primary | ICD-10-CM

## 2023-08-31 DIAGNOSIS — Z98.1 S/P CERVICAL SPINAL FUSION: ICD-10-CM

## 2023-08-31 PROCEDURE — 99024 POSTOP FOLLOW-UP VISIT: CPT | Performed by: STUDENT IN AN ORGANIZED HEALTH CARE EDUCATION/TRAINING PROGRAM

## 2023-08-31 PROCEDURE — 72040 X-RAY EXAM NECK SPINE 2-3 VW: CPT

## 2023-08-31 RX ORDER — GABAPENTIN 100 MG/1
100 CAPSULE ORAL NIGHTLY
Qty: 30 CAPSULE | Refills: 0 | Status: SHIPPED | OUTPATIENT
Start: 2023-08-31 | End: 2023-09-30

## 2023-08-31 RX ORDER — CYCLOBENZAPRINE HCL 5 MG
10 TABLET ORAL 3 TIMES DAILY PRN
Qty: 30 TABLET | Refills: 0 | Status: SHIPPED | OUTPATIENT
Start: 2023-08-31 | End: 2023-09-05

## 2023-09-01 DIAGNOSIS — Z98.1 S/P CERVICAL SPINAL FUSION: Primary | ICD-10-CM

## 2023-09-07 RX ORDER — BACLOFEN 5 MG/1
5 TABLET ORAL 3 TIMES DAILY
Qty: 90 TABLET | Refills: 0 | Status: SHIPPED | OUTPATIENT
Start: 2023-09-07 | End: 2023-10-07

## 2023-09-18 ENCOUNTER — EVALUATION (OUTPATIENT)
Dept: PHYSICAL THERAPY | Facility: CLINIC | Age: 58
End: 2023-09-18
Payer: COMMERCIAL

## 2023-09-18 DIAGNOSIS — Z47.89 AFTERCARE FOLLOWING SURGERY OF THE MUSCULOSKELETAL SYSTEM: Primary | ICD-10-CM

## 2023-09-18 DIAGNOSIS — M54.2 NECK PAIN: ICD-10-CM

## 2023-09-18 PROCEDURE — 97112 NEUROMUSCULAR REEDUCATION: CPT | Performed by: PHYSICAL THERAPIST

## 2023-09-18 PROCEDURE — 97162 PT EVAL MOD COMPLEX 30 MIN: CPT | Performed by: PHYSICAL THERAPIST

## 2023-09-18 NOTE — PROGRESS NOTES
PT Evaluation     Today's date: 2023  Patient name: Dylan Arauz  : 1965  MRN: [id number]  Referring provider: Kimmy Garcia  Dx:   Encounter Diagnosis     ICD-10-CM    1. Aftercare following surgery of the musculoskeletal system  Z47.89       2. Neck pain  M54.2                      Assessment  Assessment details: 61year old female patient reports to PT s/p C4-C5, C5-C6 ACDF with history of cervical myelopathy with myelomalacia. UQS revealed gross decreased R UE strength. Patient presents with hypertonic cervical musculature, adhesions in anterior spine, and decreased motor coordination of postural/shoulder musculature. Patient will benefit from skilled PT services to address current impairments and functional limitations to help patient return to her PLOF. Impairments: abnormal muscle firing, abnormal muscle tone, abnormal or restricted ROM, abnormal movement, activity intolerance, impaired physical strength, lacks appropriate home exercise program and pain with function    Symptom irritability: highUnderstanding of Dx/Px/POC: good   Prognosis: good    Goals  STG  1. Patient will be independent with completion of HEP throughout therapy  2. Patient will have at worst 5/10 pain so patient can sleep with less discomfort in 3 weeks. LTG  1. Patient will increase R cervical rotation and extension to at least moderate restriction in 4 weeks. 2. Patient will increase R shoulder strength at least 1/2 grade so patient can use her R UE for functional tasks with less difficulty in 6 weeks.      Plan  Patient would benefit from: skilled physical therapy  Planned therapy interventions: abdominal trunk stabilization, activity modification, IASTM, joint mobilization, kinesiology taping, motor coordination training, nerve gliding, neuromuscular re-education, patient education, strengthening, stretching, therapeutic activities, therapeutic exercise, home exercise program, functional ROM exercises and flexibility  Frequency: 2x week  Duration in weeks: 6  Treatment plan discussed with: patient        Subjective Evaluation    History of Present Illness  Mechanism of injury: Patient reports s/p C4-C5, C5-C6 ACDF with history of cervical myelopathy with myelomalacia. Patient's surgery was 23. Patient notes her R UE are actually worse sometimes. Patient also is more limited looking to her right, which affects her driving, and looking up. Patient notes her symptoms down her arm are numbness/tingling as well as pain. Patient still has trouble sleeping. Patient does note with general activity her R UE symptoms get a lot worse. She has a hard time dressing herself and completing yard/house work. Patient does have some issues bending forward as feels pressure in her anterior neck. Patient is not back to work currently. Patient has no difficulty looking down. Patient does note her legs are a little better as she is walking better and feels less stiff. Patient also does note some increased R shoulder mobility. Patient Goals  Patient goals for therapy: decreased pain, increased motion, increased strength, return to sport/leisure activities, return to work and independence with ADLs/IADLs    Pain  Current pain ratin  At best pain ratin  At worst pain ratin  Quality: dull ache, radiating and burning  Relieving factors: change in position and medications (biofreeze spray/patch)  Aggravating factors: lifting and overhead activity    Treatments  Previous treatment: physical therapy  Current treatment: physical therapy        Objective     Concurrent Complaints  Positive for disturbed sleep.  Negative for night pain, dizziness, faints, headaches and nausea/motion sickness    Active Range of Motion   Cervical/Thoracic Spine       Cervical    Flexion:  Restriction level: maximal  Extension:  with pain Restriction level: maximal  Left rotation:  Restriction level: moderate  Right rotation:  with pain Restriction level: maximal  Left Shoulder   Flexion: WFL  Abduction: WFL    Right Shoulder   Flexion: 120 degrees with pain  Abduction: 60 degrees with pain    Strength/Myotome Testing   Cervical Spine     Left   Interossei strength (t1): 4    Right   Interossei strength (t1): 3+    Left Shoulder     Isolated Muscles   Lower trapezius: 3+   Middle trapezius: 3+   Serratus anterior: 3+     Right Shoulder     Isolated Muscles   Lower trapezius: 3+   Middle trapezius: 3+   Serratus anterior: 3+     Left Elbow   Flexion: 4  Extension: 4    Right Elbow   Flexion: 3+  Extension: 3+    Left Wrist/Hand   Wrist flexion: 4  Thumb extension: 4    Right Wrist/Hand   Wrist extension: 3+  Wrist flexion: 3+  Thumb extension: 3+    General Comments:      Cervical/Thoracic Comments  Hypertonic scalenes  Hypertonicity/adhesions noted in anterior cervical region  Neuro Exam:     Headaches   Patient reports headaches: No.              Precautions: s/p C4-C5, C5-C6 ACDF on 7/26/23 with history of cervical myelopathy with myelomalacia. Precautions no lifting more than gallon of milk.  Prefers to lie SEMIRECUMBENT    Manuals 9/18            Cervical mx work [address] [place]            R shoulder PRom assessed            R shoulder mx work                          Neuro Re-Ed             [employer] tucks r            scap 4                                                                              Ther Ex             UBE or treadmill             scap retraction r            Wall slides r            Self first rib mob r                         No moneys             Standing shoulder ER/IR                          Ther Activity                                       Gait Training                                       Modalities

## 2023-09-18 NOTE — LETTER
2023    Diana Rojas  [address]    Patient: Xander Nina   YOB: 1965   Date of Visit: 2023     Encounter Diagnosis     ICD-10-CM    1. Aftercare following surgery of the musculoskeletal system  Z47.89       2. Neck pain  M54.2           Dear Dr. Rashad Velasquez: Thank you for your recent referral of Xander Nina. Please review the attached evaluation summary from Marcella's recent visit. Please verify that you agree with the plan of care by signing the attached order. If you have any questions or concerns, please do not hesitate to call. I sincerely appreciate the opportunity to share in the care of one of your patients and hope to have another opportunity to work with you in the near future. Sincerely,    Tova Bryson, PT      Referring Provider:      I certify that I have read the below Plan of Care and certify the need for these services furnished under this plan of treatment while under my care. Diana Rojas  [address] [facility] [address]  Via Fax: [phone]          PT Evaluation     Today's date: 2023  Patient name: Xander Nina  : 1965  MRN: [id number]  Referring provider: Diana Rojas  Dx:   Encounter Diagnosis     ICD-10-CM    1. Aftercare following surgery of the musculoskeletal system  Z47.89       2. Neck pain  M54.2                      Assessment  Assessment details: 61year old female patient reports to PT s/p C4-C5, C5-C6 ACDF with history of cervical myelopathy with myelomalacia. UQS revealed gross decreased R UE strength. Patient presents with hypertonic cervical musculature, adhesions in anterior spine, and decreased motor coordination of postural/shoulder musculature. Patient will benefit from skilled PT services to address current impairments and functional limitations to help patient return to her PLOF.      Impairments: abnormal muscle firing, abnormal muscle tone, abnormal or restricted ROM, abnormal movement, activity intolerance, impaired physical strength, lacks appropriate home exercise program and pain with function    Symptom irritability: highUnderstanding of Dx/Px/POC: good   Prognosis: good    Goals  STG  1. Patient will be independent with completion of HEP throughout therapy  2. Patient will have at worst 5/10 pain so patient can sleep with less discomfort in 3 weeks. LTG  1. Patient will increase R cervical rotation and extension to at least moderate restriction in 4 weeks. 2. Patient will increase R shoulder strength at least 1/2 grade so patient can use her R UE for functional tasks with less difficulty in 6 weeks. Plan  Patient would benefit from: skilled physical therapy  Planned therapy interventions: abdominal trunk stabilization, activity modification, IASTM, joint mobilization, kinesiology taping, motor coordination training, nerve gliding, neuromuscular re-education, patient education, strengthening, stretching, therapeutic activities, therapeutic exercise, home exercise program, functional ROM exercises and flexibility  Frequency: 2x week  Duration in weeks: 6  Treatment plan discussed with: patient        Subjective Evaluation    History of Present Illness  Mechanism of injury: Patient reports s/p C4-C5, C5-C6 ACDF with history of cervical myelopathy with myelomalacia. Patient's surgery was 7/26/23. Patient notes her R UE are actually worse sometimes. Patient also is more limited looking to her right, which affects her driving, and looking up. Patient notes her symptoms down her arm are numbness/tingling as well as pain. Patient still has trouble sleeping. Patient does note with general activity her R UE symptoms get a lot worse. She has a hard time dressing herself and completing yard/house work. Patient does have some issues bending forward as feels pressure in her anterior neck. Patient is not back to work currently.       Patient has no difficulty looking down. Patient does note her legs are a little better as she is walking better and feels less stiff. Patient also does note some increased R shoulder mobility. Patient Goals  Patient goals for therapy: decreased pain, increased motion, increased strength, return to sport/leisure activities, return to work and independence with ADLs/IADLs    Pain  Current pain ratin  At best pain ratin  At worst pain ratin  Quality: dull ache, radiating and burning  Relieving factors: change in position and medications (biofreeze spray/patch)  Aggravating factors: lifting and overhead activity    Treatments  Previous treatment: physical therapy  Current treatment: physical therapy        Objective     Concurrent Complaints  Positive for disturbed sleep.  Negative for night pain, dizziness, faints, headaches and nausea/motion sickness    Active Range of Motion   Cervical/Thoracic Spine       Cervical    Flexion:  Restriction level: maximal  Extension:  with pain Restriction level: maximal  Left rotation:  Restriction level: moderate  Right rotation:  with pain Restriction level: maximal  Left Shoulder   Flexion: WFL  Abduction: WFL    Right Shoulder   Flexion: 120 degrees with pain  Abduction: 60 degrees with pain    Strength/Myotome Testing   Cervical Spine     Left   Interossei strength (t1): 4    Right   Interossei strength (t1): 3+    Left Shoulder     Isolated Muscles   Lower trapezius: 3+   Middle trapezius: 3+   Serratus anterior: 3+     Right Shoulder     Isolated Muscles   Lower trapezius: 3+   Middle trapezius: 3+   Serratus anterior: 3+     Left Elbow   Flexion: 4  Extension: 4    Right Elbow   Flexion: 3+  Extension: 3+    Left Wrist/Hand   Wrist flexion: 4  Thumb extension: 4    Right Wrist/Hand   Wrist extension: 3+  Wrist flexion: 3+  Thumb extension: 3+    General Comments:      Cervical/Thoracic Comments  Hypertonic scalenes  Hypertonicity/adhesions noted in anterior cervical region  Neuro Exam:     Headaches   Patient reports headaches: No.             Precautions: s/p C4-C5, C5-C6 ACDF on 7/26/23 with history of cervical myelopathy with myelomalacia. Precautions no lifting more than gallon of milk.  Prefers to lie SEMIRECUMBENT    Manuals 9/18            Cervical mx work [employer]            R shoulder PRom assessed            R shoulder mx work                          Neuro Re-Ed             [employer] tucks r            scap 4                                                                              Ther Ex             UBE or treadmill             scap retraction r            Wall slides r            Self first rib mob r                         No moneys             Standing shoulder ER/IR                          Ther Activity                                       Gait Training                                       Modalities

## 2023-09-21 ENCOUNTER — OFFICE VISIT (OUTPATIENT)
Dept: PHYSICAL THERAPY | Facility: CLINIC | Age: 58
End: 2023-09-21
Payer: COMMERCIAL

## 2023-09-21 DIAGNOSIS — Z47.89 AFTERCARE FOLLOWING SURGERY OF THE MUSCULOSKELETAL SYSTEM: Primary | ICD-10-CM

## 2023-09-21 DIAGNOSIS — M54.2 NECK PAIN: ICD-10-CM

## 2023-09-21 PROCEDURE — 97110 THERAPEUTIC EXERCISES: CPT | Performed by: PHYSICAL THERAPIST

## 2023-09-21 PROCEDURE — 97140 MANUAL THERAPY 1/> REGIONS: CPT | Performed by: PHYSICAL THERAPIST

## 2023-09-21 PROCEDURE — 97112 NEUROMUSCULAR REEDUCATION: CPT | Performed by: PHYSICAL THERAPIST

## 2023-09-21 NOTE — PROGRESS NOTES
Daily Note     Today's date: 2023  Patient name: Elisa Jim  : 1965  MRN: [id number]  Referring provider: Martina Olmos  Dx:   Encounter Diagnosis     ICD-10-CM    1. Aftercare following surgery of the musculoskeletal system  Z47.89       2. Neck pain  M54.2                      Subjective: Patient notes sore after IE. Using heat. Wall slides tough and aggravates symptoms. Objective: See treatment diary below    Table slides more tolerable  Chin tucks with retraction in semirecumbent position had positive effect on neck pain. No effect on R UE symptoms. Assessment: Tolerated treatment well. Patient would benefit from continued PT. Treatment plan initiated. Patient may have directional preference for cervical retraction/extension/thoracic extension based on slight positive response to semirecumbent chin tucks with retraction. Anterior cervical restrictions addressed with manuals. Plan: Progress treatment as tolerated. Precautions: s/p C4-C5, C5-C6 ACDF on 23 with history of cervical myelopathy with myelomalacia. Precautions no lifting more than gallon of milk.  Prefers to lie SEMIRECUMBENT    Manuals            Cervical mx work [address] [place] and anterior           R shoulder PRom assessed assessed           R shoulder mx work                          Neuro Re-Ed             José Luis Baeza r 10x 5" holds w/ retraction           scap 4                                                                              Ther Ex             UBE or treadmill  treadmill 5 min           scap retraction r            Wall slides r 5x pain, 5x table slides better           Self first rib mob r                         No moneys             Standing shoulder ER/IR             sidelying ER  10x           Ther Activity                                       Gait Training                                       Modalities

## 2023-09-25 ENCOUNTER — OFFICE VISIT (OUTPATIENT)
Dept: PHYSICAL THERAPY | Facility: CLINIC | Age: 58
End: 2023-09-25
Payer: COMMERCIAL

## 2023-09-25 DIAGNOSIS — M54.2 NECK PAIN: ICD-10-CM

## 2023-09-25 DIAGNOSIS — Z47.89 AFTERCARE FOLLOWING SURGERY OF THE MUSCULOSKELETAL SYSTEM: Primary | ICD-10-CM

## 2023-09-25 PROCEDURE — 97140 MANUAL THERAPY 1/> REGIONS: CPT | Performed by: PHYSICAL THERAPIST

## 2023-09-25 PROCEDURE — 97110 THERAPEUTIC EXERCISES: CPT | Performed by: PHYSICAL THERAPIST

## 2023-09-25 PROCEDURE — 97112 NEUROMUSCULAR REEDUCATION: CPT | Performed by: PHYSICAL THERAPIST

## 2023-09-25 NOTE — PROGRESS NOTES
Daily Note     Today's date: 2023  Patient name: Elisa Jim  : 1965  MRN: [id number]  Referring provider: Martina Olmos  Dx:   Encounter Diagnosis     ICD-10-CM    1. Aftercare following surgery of the musculoskeletal system  Z47.89       2. Neck pain  M54.2                      Subjective: Patient notes sore today. Used biofreeze. Objective: See treatment diary below    Table slides more tolerable  Chin tucks with retraction in semirecumbent position had positive effect on neck pain. No effect on R UE symptoms. Assessment: Tolerated treatment well. Patient would benefit from continued PT. Treatment plan progressed. Patient may have directional preference for cervical retraction/extension/thoracic extension based on slight positive response to semirecumbent chin tucks with retraction. Anterior cervical restrictions addressed with manuals. Plan: Progress treatment as tolerated. Precautions: s/p C4-C5, C5-C6 ACDF on 23 with history of cervical myelopathy with myelomalacia. Precautions no lifting more than gallon of milk.  Prefers to lie SEMIRECUMBENT    Manuals           Cervical mx work [address], [address] [place] and anterior [address], [address] [place] and anterior          R shoulder PRom assessed assessed           R shoulder mx work                          Neuro Re-Ed             José Luis Baeza r 10x 5" holds w/ retraction 12 x3" holds w/ retraction          scap 4   2x10 grn low rows                                                                            Ther Ex             UBE or treadmill  treadmill 5 min 5 min           scap retraction r            Wall slides r 5x pain, 5x table slides better 10x           Self first rib mob r                         No moneys             Standing shoulder ER/IR   IR 2x10 single black          sidelying ER  10x 2x6 super fatigued          Ther Activity                                       Gait Training                                       Modalities

## 2023-09-27 ENCOUNTER — OFFICE VISIT (OUTPATIENT)
Dept: PHYSICAL THERAPY | Facility: CLINIC | Age: 58
End: 2023-09-27
Payer: COMMERCIAL

## 2023-09-27 DIAGNOSIS — M54.2 NECK PAIN: ICD-10-CM

## 2023-09-27 DIAGNOSIS — Z47.89 AFTERCARE FOLLOWING SURGERY OF THE MUSCULOSKELETAL SYSTEM: Primary | ICD-10-CM

## 2023-09-27 PROCEDURE — 97112 NEUROMUSCULAR REEDUCATION: CPT | Performed by: PHYSICAL THERAPIST

## 2023-09-27 PROCEDURE — 97140 MANUAL THERAPY 1/> REGIONS: CPT | Performed by: PHYSICAL THERAPIST

## 2023-09-27 PROCEDURE — 97010 HOT OR COLD PACKS THERAPY: CPT | Performed by: PHYSICAL THERAPIST

## 2023-09-27 PROCEDURE — 97110 THERAPEUTIC EXERCISES: CPT | Performed by: PHYSICAL THERAPIST

## 2023-09-27 NOTE — PROGRESS NOTES
Daily Note     Today's date: 2023  Patient name: Elisa Jim  : 1965  MRN: [id number]  Referring provider: Martina Olmos  Dx:   Encounter Diagnosis     ICD-10-CM    1. Aftercare following surgery of the musculoskeletal system  Z47.89       2. Neck pain  M54.2                      Subjective: Patient notes continued soreness/pain in R UE. Notes feels like it gets locked up. .    Objective: See treatment diary below    Table slides more tolerable  Chin tucks with retraction in semirecumbent position no effect today  -no effect with PT distraction and retraction     Assessment: Tolerated treatment well. Patient would benefit from continued PT. Treatment plan more manual focused this visit. Plan: Progress treatment as tolerated. Precautions: s/p C4-C5, C5-C6 ACDF on 23 with history of cervical myelopathy with myelomalacia. Precautions no lifting more than gallon of milk.  Prefers to lie SEMIRECUMBENT    Manuals          Cervical mx work [address], [address], [address] [place] and anterior [address], [address] [place] and anterior [address], [address] [place] and anterior         R shoulder PRom assessed assessed  assessed         R shoulder mx work             scap mobs    [address], [address] [place]         Semi recumbent cerv dist/retraction    [address], [address] [place] no effect         Thoracic mobs    S/l [address], [address] [place]         Neuro Re-Ed             Chin tucks r 10x 5" holds w/ retraction 12 x3" holds w/ retraction 15x 3" holds w/ retraction, 10x 10" holds chin tucks         scap 4   2x10 grn low rows  -         S/l mid/low trap PNF    [address], [address] [place]                                                             Ther Ex             UBE or treadmill  treadmill 5 min 5 min  5 min          scap retraction r            Wall slides r 5x pain, 5x table slides better 10x  10x table slides         Self first rib mob r            Shoulder isometrics    5x 5" holds ER/IR         No moneys             Standing shoulder ER/IR   IR 2x10 single black -         sidelying ER  10x 2x6 super fatigued 2x8 super fatigued         Ther Activity Gait Training                                       Modalities

## 2023-09-29 ENCOUNTER — OFFICE VISIT (OUTPATIENT)
Dept: PHYSICAL THERAPY | Facility: CLINIC | Age: 58
End: 2023-09-29
Payer: COMMERCIAL

## 2023-09-29 DIAGNOSIS — Z47.89 AFTERCARE FOLLOWING SURGERY OF THE MUSCULOSKELETAL SYSTEM: Primary | ICD-10-CM

## 2023-09-29 DIAGNOSIS — M54.2 NECK PAIN: ICD-10-CM

## 2023-09-29 PROCEDURE — 97110 THERAPEUTIC EXERCISES: CPT | Performed by: PHYSICAL THERAPIST

## 2023-09-29 PROCEDURE — 97112 NEUROMUSCULAR REEDUCATION: CPT | Performed by: PHYSICAL THERAPIST

## 2023-09-29 PROCEDURE — 97140 MANUAL THERAPY 1/> REGIONS: CPT | Performed by: PHYSICAL THERAPIST

## 2023-10-02 ENCOUNTER — OFFICE VISIT (OUTPATIENT)
Dept: PHYSICAL THERAPY | Facility: CLINIC | Age: 58
End: 2023-10-02
Payer: COMMERCIAL

## 2023-10-02 DIAGNOSIS — Z47.89 AFTERCARE FOLLOWING SURGERY OF THE MUSCULOSKELETAL SYSTEM: Primary | ICD-10-CM

## 2023-10-02 DIAGNOSIS — M54.2 NECK PAIN: ICD-10-CM

## 2023-10-02 PROCEDURE — 97112 NEUROMUSCULAR REEDUCATION: CPT | Performed by: PHYSICAL THERAPIST

## 2023-10-02 PROCEDURE — 97140 MANUAL THERAPY 1/> REGIONS: CPT | Performed by: PHYSICAL THERAPIST

## 2023-10-02 PROCEDURE — 97110 THERAPEUTIC EXERCISES: CPT | Performed by: PHYSICAL THERAPIST

## 2023-10-02 NOTE — PROGRESS NOTES
Daily Note     Today's date: 10/2/2023  Patient name: Abilio Mars  : 1965  MRN: [id number]  Referring provider: Sadiq Abernathy  Dx:   Encounter Diagnosis     ICD-10-CM    1. Aftercare following surgery of the musculoskeletal system  Z47.89       2. Neck pain  M54.2                      Subjective: Patient notes nerves are more active today with her symptoms and has headache. Objective: See treatment diary below    Table slides more tolerable  Chin tucks with retraction in semirecumbent position no effect today  -no effect with PT distraction and retraction     Assessment: Tolerated treatment well. Patient would benefit from continued PT. Seated chin tucks trialed and provoked some L UE symptoms. After changing posture to address kyphosis, symptoms decreased. Symptoms were transient. Plan: Progress treatment as tolerated. Precautions: s/p C4-C5, C5-C6 ACDF on 23 with history of cervical myelopathy with myelomalacia. Precautions no lifting more than gallon of milk.  Prefers to lie SEMIRECUMBENT    Manuals  10       Cervical mx work [address], [address] [facility] [address], [address] [facility] and anterior [address], [address] [facility] and anterior [address], [address] [facility] and anterior [address], [address] [facility] [address], [address] [facility]       R shoulder PRom assessed assessed  assessed         R shoulder mx work             scap mobs    [address], [address] [facility] [address], [address] [facility] [address], [address] [facility]       Semi recumbent cerv dist/retraction    [address], [address] [facility] no effect         Thoracic mobs    S/l [address], [address] [facility] [address], [address] [facility]        Neuro Re-Ed             Chin tucks r 10x 5" holds w/ retraction 12 x3" holds w/ retraction 15x 3" holds w/ retraction, 10x 10" holds chin tucks 15x 3" holds w/ retraction, 10x 10" holds chin tucks 15x 3" holds w/ retraction supine, 10x seated        scap 4   2x10 grn low rows  -         S/l mid/low trap PNF    [address], [address] [facility] [address], [address] [place]        Thoracic mobs      S/l [address], [address] [facility]                                              Ther Ex             UBE or treadmill  treadmill 5 min 5 min  5 min  5 min  5 min        scap retraction r            Wall slides r 5x pain, 5x table slides better 10x  10x table slides 10x  10x table slides       Self first rib mob r            Shoulder isometrics    5x 5" holds ER/IR 5x 5" holds Er/IR 5x 5" holds ER/IR       No moneys      10x        Standing shoulder ER/IR   IR 2x10 single black -         Seated thoracic extension      10x       sidelying ER  10x 2x6 super fatigued 2x8 super fatigued 2x8  2x8       Ther Activity                                       Gait Training                                       Modalities

## 2023-10-04 ENCOUNTER — OFFICE VISIT (OUTPATIENT)
Dept: PHYSICAL THERAPY | Facility: CLINIC | Age: 58
End: 2023-10-04
Payer: COMMERCIAL

## 2023-10-04 ENCOUNTER — TELEPHONE (OUTPATIENT)
Dept: ORTHOPEDICS | Facility: CLINIC | Age: 58
End: 2023-10-04
Payer: COMMERCIAL

## 2023-10-04 DIAGNOSIS — Z98.1 S/P CERVICAL SPINAL FUSION: ICD-10-CM

## 2023-10-04 DIAGNOSIS — M54.2 NECK PAIN: ICD-10-CM

## 2023-10-04 DIAGNOSIS — M54.12 CERVICAL RADICULOPATHY: Primary | ICD-10-CM

## 2023-10-04 DIAGNOSIS — Z47.89 AFTERCARE FOLLOWING SURGERY OF THE MUSCULOSKELETAL SYSTEM: Primary | ICD-10-CM

## 2023-10-04 PROCEDURE — 97110 THERAPEUTIC EXERCISES: CPT | Performed by: PHYSICAL THERAPIST

## 2023-10-04 PROCEDURE — 97140 MANUAL THERAPY 1/> REGIONS: CPT | Performed by: PHYSICAL THERAPIST

## 2023-10-04 PROCEDURE — 97112 NEUROMUSCULAR REEDUCATION: CPT | Performed by: PHYSICAL THERAPIST

## 2023-10-04 RX ORDER — METHYLPREDNISOLONE 4 MG/1
TABLET ORAL
Qty: 42 TABLET | Refills: 0 | Status: SHIPPED | OUTPATIENT
Start: 2023-10-04

## 2023-10-04 NOTE — PROGRESS NOTES
Daily Note     Today's date: 10/4/2023  Patient name: Tiffanie Abdalla  : 1965  MRN: [id number]  Referring provider: Jonathan Edwards  Dx:   Encounter Diagnosis     ICD-10-CM    1. Aftercare following surgery of the musculoskeletal system  Z47.89       2. Neck pain  M54.2                      Subjective: Patient notes nerve pain is relentless in R UE and going into L UE now. Objective: See treatment diary below    Table slides more tolerable  Chin tucks with retraction in semirecumbent position no effect today  -no effect with PT distraction and retraction     Assessment: Tolerated treatment well. Patient would benefit from continued PT. Pec stretch and modification to first rib mob added due to other lower cervical/thoracic restrictions. Plan: Progress treatment as tolerated. Precautions: s/p C4-C5, C5-C6 ACDF on 23 with history of cervical myelopathy with myelomalacia. Precautions no lifting more than gallon of milk.  Prefers to lie SEMIRECUMBENT    Manuals 9/25 9/27 9/29 10/2 10/4      Cervical mx work [address], [address] [place] and anterior [address], [address] [place] and anterior [address], 3 [facility] [address], [address] [facility] [address], [address] [place]      R shoulder PRom  assessed         R shoulder mx work           scap mobs  [address], 3 [facility] [address], [address] [facility] [address], [address] [facility] [address], 3 [facility]      Semi recumbent cerv dist/retraction  [address], 3 [place] no effect         Thoracic mobs  S/l [address], 3 [facility] [address], 3 [facility]  [address], [address] [place]      Neuro Re-Ed           Chin tucks 12 x3" holds w/ retraction 15x 3" holds w/ retraction, 10x 10" holds chin tucks 15x 3" holds w/ retraction, 10x 10" holds chin tucks 15x 3" holds w/ retraction supine, 10x seated  2x10 3" holds w/ retraction      scap 4 2x10 grn low rows  -         S/l mid/low trap PNF  [address], [address] [place] [address], [address] [place]                                                    Ther Ex           UBE or treadmill 5 min  5 min  5 min  5 min  5 min       scap retraction           Wall slides 10x  10x table slides 10x  10x table slides 12x table slides       Self first rib mob     10x 3" holds w/ shear       Shoulder isometrics  5x 5" holds ER/IR 5x 5" holds Er/IR 5x 5" holds ER/IR 5x 5" holds ER/IR       No moneys    10x  10x orange      Standing shoulder ER/IR IR 2x10 single black -         Seated thoracic extension    10x 12x 3" holds      Doorway pec stretch     3x20" holds                 sidelying ER 2x6 super fatigued 2x8 super fatigued 2x8  2x8 2x10      Ther Activity                                 Gait Training                                 Modalities

## 2023-10-06 ENCOUNTER — OFFICE VISIT (OUTPATIENT)
Dept: PHYSICAL THERAPY | Facility: CLINIC | Age: 58
End: 2023-10-06
Payer: COMMERCIAL

## 2023-10-06 DIAGNOSIS — Z47.89 AFTERCARE FOLLOWING SURGERY OF THE MUSCULOSKELETAL SYSTEM: Primary | ICD-10-CM

## 2023-10-06 DIAGNOSIS — M54.2 NECK PAIN: ICD-10-CM

## 2023-10-06 PROCEDURE — 97110 THERAPEUTIC EXERCISES: CPT | Performed by: PHYSICAL THERAPIST

## 2023-10-06 PROCEDURE — 97140 MANUAL THERAPY 1/> REGIONS: CPT | Performed by: PHYSICAL THERAPIST

## 2023-10-06 PROCEDURE — 97112 NEUROMUSCULAR REEDUCATION: CPT | Performed by: PHYSICAL THERAPIST

## 2023-10-06 NOTE — PROGRESS NOTES
Daily Note     Today's date: 10/6/2023  Patient name: Edmond Chavis  : 1965  MRN: [id number]  Referring provider: Mir Orta  Dx:   Encounter Diagnosis     ICD-10-CM    1. Aftercare following surgery of the musculoskeletal system  Z47.89       2. Neck pain  M54.2                      Subjective: Patient notes didn't take pain meds this morning so hurts a lot. Patient notes saw MD and is taking taper pack of steroids 2 doses to see if it helps. Objective: See treatment diary below    Table slides more tolerable  Chin tucks with retraction in semirecumbent position no effect today  -no effect with PT distraction and retraction     Assessment: Tolerated treatment well. Patient would benefit from continued PT. Plan: Progress treatment as tolerated. Precautions: s/p C4-C5, C5-C6 ACDF on 23 with history of cervical myelopathy with myelomalacia. Precautions no lifting more than gallon of milk.  Prefers to lie SEMIRECUMBENT    Manuals 9/25 9/27 9/29 10/2 10/4 10/6     Cervical mx work [address], [address] [place] and anterior [address], [address] [place] and anterior [address], 3 [facility] [address], [address] [facility] [address], 3 [facility] [address], [address] [place]     R shoulder PRom  assessed         R shoulder mx work           scap mobs  [address], 3 [facility] [address], 3 [facility] [address], 3 [facility] [address], 3 [facility] [address], 3 [facility]     Semi recumbent cerv dist/retraction  [address], 3 [place] no effect         Thoracic mobs  S/l [address], 3 [facility] [address], 3 [facility]  [address], 3 [facility] [address], 3 [facility]     Neuro Re-Ed           Chin tucks 12 x3" holds w/ retraction 15x 3" holds w/ retraction, 10x 10" holds chin tucks 15x 3" holds w/ retraction, 10x 10" holds chin tucks 15x 3" holds w/ retraction supine, 10x seated  2x10 3" holds w/ retraction 2x10 3" holds w/ retraction     scap 4 2x10 grn low rows  -         S/l mid/low trap PNF  [address], [address] [place] [address], [address] [place]                                                    Ther Ex           UBE or treadmill 5 min  5 min  5 min  5 min  5 min  5 min      scap retraction           Wall slides 10x  10x table slides 10x  10x table slides 12x table slides  12x table slides     Self first rib mob     10x 3" holds w/ shear  12x 3" holds w/ shear     Shoulder isometrics  5x 5" holds ER/IR 5x 5" holds Er/IR 5x 5" holds ER/IR 5x 5" holds ER/IR  5x 5" holds ER/IR     No moneys    10x  10x orange 10x orange     Standing shoulder ER/IR IR 2x10 single black -         Seated thoracic extension    10x 12x 3" holds 15x 3" holds     Doorway pec stretch     3x20" holds 3x20" holds                sidelying ER 2x6 super fatigued 2x8 super fatigued 2x8  2x8 2x10 2x10     Ther Activity                                 Gait Training                                 Modalities

## 2023-10-10 ENCOUNTER — OFFICE VISIT (OUTPATIENT)
Dept: PHYSICAL THERAPY | Facility: CLINIC | Age: 58
End: 2023-10-10
Payer: COMMERCIAL

## 2023-10-10 DIAGNOSIS — M54.2 NECK PAIN: ICD-10-CM

## 2023-10-10 DIAGNOSIS — Z47.89 AFTERCARE FOLLOWING SURGERY OF THE MUSCULOSKELETAL SYSTEM: Primary | ICD-10-CM

## 2023-10-10 PROCEDURE — 97112 NEUROMUSCULAR REEDUCATION: CPT | Performed by: PHYSICAL THERAPIST

## 2023-10-10 PROCEDURE — 97140 MANUAL THERAPY 1/> REGIONS: CPT | Performed by: PHYSICAL THERAPIST

## 2023-10-10 PROCEDURE — 97110 THERAPEUTIC EXERCISES: CPT | Performed by: PHYSICAL THERAPIST

## 2023-10-10 NOTE — PROGRESS NOTES
Daily Note     Today's date: 10/10/2023  Patient name: Lalitha Ohara  : 1965  MRN: [id number]  Referring provider: Riley Valentine  Dx:   Encounter Diagnosis     ICD-10-CM    1. Aftercare following surgery of the musculoskeletal system  Z47.89       2. Neck pain  M54.2                      Subjective: Patient notes was given prednisone taper and felt good when she was on the 6 and the 5 taper but now that she is taking 4 and 3 her pain comes right back. .    Objective: See treatment diary below    Seated R cervical SB repeated movement assigned to HEP due to slight positive effect    Assessment: Tolerated treatment well. Patient would benefit from continued PT. Plan: Progress treatment as tolerated. Precautions: s/p C4-C5, C5-C6 ACDF on 23 with history of cervical myelopathy with myelomalacia. Precautions no lifting more than gallon of milk.  Prefers to lie SEMIRECUMBENT    Manuals 9/25 9/27 9/29 10/2 10/4 10/6 10/10    Cervical mx work [address], [address] [place] and anterior [address], [address] [place] and anterior [address], 3 [facility] [address], 3 [facility] [address], 3 [facility] [address], 3 [facility] [address], 3 [place]    R shoulder PRom  assessed         R shoulder mx work           scap mobs  [address], 3 [facility] [address], 3 [facility] [address], 3 [facility] [address], 3 [facility] [address], 3 [facility] [address], 3 [facility]    Semi recumbent cerv dist/retraction  [address], 3 [place] no effect         Thoracic mobs  S/l [address], 3 [facility] [address], 3 [facility]  [address], 3 [facility] [address], 3 [facility] [address], 3 [place]    Neuro Re-Ed            Chin tucks 12 x3" holds w/ retraction 15x 3" holds w/ retraction, 10x 10" holds chin tucks 15x 3" holds w/ retraction, 10x 10" holds chin tucks 15x 3" holds w/ retraction supine, 10x seated  2x10 3" holds w/ retraction 2x10 3" holds w/ retraction 6x 1" hold w/ hand lift off    scap 4 2x10 grn low rows  -         S/l mid/low trap PNF  [address], [address] [place] [address], [address] [place]                                                    Ther Ex           UBE or treadmill 5 min  5 min  5 min  5 min  5 min  5 min  5 min    scap retraction           Wall slides 10x  10x table slides 10x  10x table slides 12x table slides  12x table slides 15x     Self first rib mob     10x 3" holds w/ shear  12x 3" holds w/ shear     Shoulder isometrics  5x 5" holds ER/IR 5x 5" holds Er/IR 5x 5" holds ER/IR 5x 5" holds ER/IR  5x 5" holds ER/IR 5x 5" holds ER/Ir     No moneys    10x  10x orange 10x orange     Standing shoulder ER/IR IR 2x10 single black -         Seated thoracic extension    10x 12x 3" holds 15x 3" holds 15x 3" holds     Doorway pec stretch     3x20" holds 3x20" holds 3x20" holds    UT ed       10x 5 lbs    Repeated R sidebend       10x     sidelying ER 2x6 super fatigued 2x8 super fatigued 2x8  2x8 2x10 2x10 2x8    Ther Activity                                 Gait Training                                 Modalities

## 2023-10-11 ENCOUNTER — TELEPHONE (OUTPATIENT)
Dept: ORTHOPEDICS | Facility: CLINIC | Age: 58
End: 2023-10-11
Payer: COMMERCIAL

## 2023-10-12 ENCOUNTER — OFFICE VISIT (OUTPATIENT)
Dept: PHYSICAL THERAPY | Facility: CLINIC | Age: 58
End: 2023-10-12
Payer: COMMERCIAL

## 2023-10-12 DIAGNOSIS — M54.2 NECK PAIN: ICD-10-CM

## 2023-10-12 DIAGNOSIS — Z47.89 AFTERCARE FOLLOWING SURGERY OF THE MUSCULOSKELETAL SYSTEM: Primary | ICD-10-CM

## 2023-10-12 PROCEDURE — 97112 NEUROMUSCULAR REEDUCATION: CPT | Performed by: PHYSICAL THERAPIST

## 2023-10-12 PROCEDURE — 97110 THERAPEUTIC EXERCISES: CPT | Performed by: PHYSICAL THERAPIST

## 2023-10-12 PROCEDURE — 97140 MANUAL THERAPY 1/> REGIONS: CPT | Performed by: PHYSICAL THERAPIST

## 2023-10-12 NOTE — PROGRESS NOTES
Daily Note     Today's date: 10/12/2023  Patient name: Jerome Rose  : 1965  MRN: [id number]  Referring provider: Jt Rojas  Dx:   Encounter Diagnosis     ICD-10-CM    1. Aftercare following surgery of the musculoskeletal system  Z47.89       2. Neck pain  M54.2                      Subjective: Patient notes has headache. Still on prednisone taper but down to a couple pills now. Pain not as earlier in the week. Objective: See treatment diary below      Assessment: Tolerated treatment well. Patient would benefit from continued PT. Manuals addressing joint restrictions with constant hypertonic scalenes focus this visit. Plan: Progress treatment as tolerated. Precautions: s/p C4-C5, C5-C6 ACDF on 23 with history of cervical myelopathy with myelomalacia. Precautions no lifting more than gallon of milk.  Prefers to lie SEMIRECUMBENT    Manuals 9/25 9/27 9/29 10/2 10/4 10/6 10/10 10/12   Cervical mx work [address], [address] [place] and anterior [address], [address] [place] and anterior [address], [address] [facility] [place] [place] [address], [address] [facility] [address], [address] [facility] [address], [address] [facility] [place] [address], [address] [facility] [place] [address], 3 [place] and upper cervical gentle mobs R   R shoulder PRom  assessed         R shoulder mx work           scap mobs  [address], [address] [facility] [place] [address], [address] [facility] [place] [address], [address] [facility] [place] [address], [address] [facility] [address], [address] [facility] [place] [address], [address] [facility] [place] [address], [address] [facility] [place] w/ post dep PNF   Semi recumbent cerv dist/retraction  [address], 3 [place] no effect         2nd rib mobs        Seated mk   Thoracic mobs  S/l [address], [address] [facility] [place] [address], [address] [facility] [place]  [address], [address] [facility] [place] [address], [address] [facility] [place] [address], [address] [facility] [place] [address], 3 [place]   Neuro Re-Ed            Chin tucks 12 x3" holds w/ retraction 15x 3" holds w/ retraction, 10x 10" holds chin tucks 15x 3" holds w/ retraction, 10x 10" holds chin tucks 15x 3" holds w/ retraction supine, 10x seated  2x10 3" holds w/ retraction 2x10 3" holds w/ retraction 6x 1" hold w/ hand lift off 2x10 3" holds w/ retraction w/ symptoms on and off   scap 4 2x10 grn low rows  -         S/l mid/low trap PNF  [address] [facility]                                                    Ther Ex           UBE or treadmill 5 min  5 min  5 min  5 min  5 min  5 min  5 min 5 min    scap retraction           Wall slides 10x  10x table slides 10x  10x table slides 12x table slides  12x table slides 15x  15x    Self first rib mob     10x 3" holds w/ shear  12x 3" holds w/ shear     Shoulder isometrics  5x 5" holds ER/IR 5x 5" holds Er/IR 5x 5" holds ER/IR 5x 5" holds ER/IR  5x 5" holds ER/IR 5x 5" holds ER/Ir  5x 5" holds Er/IR   No moneys    10x  10x orange 10x orange     Standing shoulder ER/IR IR 2x10 single black -         Seated thoracic extension    10x 12x 3" holds 15x 3" holds 15x 3" holds  15x 3' holds   Doorway pec stretch     3x20" holds 3x20" holds 3x20" holds 3x20" holds    UT ed       10x 5 lbs 15x 8 lbs    Repeated R sidebend       10x  reviewed   sidelying ER 2x6 super fatigued 2x8 super fatigued 2x8  2x8 2x10 2x10 2x8 10x, 8x   Ther Activity                                 Gait Training                                 Modalities

## 2023-10-16 ENCOUNTER — OFFICE VISIT (OUTPATIENT)
Dept: PHYSICAL THERAPY | Facility: CLINIC | Age: 58
End: 2023-10-16
Payer: COMMERCIAL

## 2023-10-16 DIAGNOSIS — M54.2 NECK PAIN: ICD-10-CM

## 2023-10-16 DIAGNOSIS — Z47.89 AFTERCARE FOLLOWING SURGERY OF THE MUSCULOSKELETAL SYSTEM: Primary | ICD-10-CM

## 2023-10-16 PROCEDURE — 97140 MANUAL THERAPY 1/> REGIONS: CPT | Performed by: PHYSICAL THERAPIST

## 2023-10-16 PROCEDURE — 97110 THERAPEUTIC EXERCISES: CPT | Performed by: PHYSICAL THERAPIST

## 2023-10-16 PROCEDURE — 97112 NEUROMUSCULAR REEDUCATION: CPT | Performed by: PHYSICAL THERAPIST

## 2023-10-16 NOTE — PROGRESS NOTES
Daily Note     Today's date: 10/16/2023  Patient name: Li Phillip  : 1965  MRN: [id number]  Referring provider: Cody Ruffin  Dx:   Encounter Diagnosis     ICD-10-CM    1. Aftercare following surgery of the musculoskeletal system  Z47.89       2. Neck pain  M54.2                      Subjective: Patient notes R UE not as bad today. Having some relief post PT sessions but next day is back again. Objective: See treatment diary below      Assessment: Tolerated treatment well. Patient would benefit from continued PT. Manuals addressing joint restrictions with constant hypertonic scalenes focus this visit. Plan: Progress treatment as tolerated. Precautions: s/p C4-C5, C5-C6 ACDF on 23 with history of cervical myelopathy with myelomalacia. Precautions no lifting more than gallon of milk.  Prefers to lie SEMIRECUMBENT    Manuals 9/27 9/29 10/2 10/4 10/6 10/10 10/12 10/16   Cervical mx work [address], [address] [place] and anterior [address], [address] [place] [address], [address] [place] [address], [address] [facility] [address], [address] [place] [address], [address] [place] [address], 3 [place] and upper cervical gentle mobs R [address], 3 [place]   R shoulder PRom assessed          R shoulder mx work           scap mobs [address], [address] [place] [address], [address] [place] [address], [address] [place] [address], 3 [facility] [place] [address], [address] [facility] [place] [address], [address] [facility] [place] [address], 3 [place] w/ post dep PNF    Semi recumbent cerv dist/retraction [address], 3 [place] no effect          2nd rib mobs       Seated mk [address], 3 [place]   Thoracic mobs S/l [address], 3 [facility] [place] [address] [facility] [place]  [address] [facility] [place] [address], 3 [facility] [address], [address] [place] [address], 3 [place] S/l Mk   Neuro Re-Ed            Chin tucks 15x 3" holds w/ retraction, 10x 10" holds chin tucks 15x 3" holds w/ retraction, 10x 10" holds chin tucks 15x 3" holds w/ retraction supine, 10x seated  2x10 3" holds w/ retraction 2x10 3" holds w/ retraction 6x 1" hold w/ hand lift off 2x10 3" holds w/ retraction w/ symptoms on and off 2x15 3" holds w/ retraction   scap 4 -       2x8  blue rows    S/l mid/low trap  [address] [place] [address], [address] [place]                                                     Ther Ex           UBE or treadmill 5 min  5 min  5 min  5 min  5 min  5 min 5 min  5 min   scap retraction           Wall slides 10x table slides 10x  10x table slides 12x table slides  12x table slides 15x  15x  15x    Self first rib mob    10x 3" holds w/ shear  12x 3" holds w/ shear      Shoulder isometrics 5x 5" holds ER/IR 5x 5" holds Er/IR 5x 5" holds ER/IR 5x 5" holds ER/IR  5x 5" holds ER/IR 5x 5" holds ER/Ir  5x 5" holds Er/IR 5x 5" holds ER/IR   No moneys   10x  10x orange 10x orange      Standing shoulder ER/IR -          Seated thoracic extension   10x 12x 3" holds 15x 3" holds 15x 3" holds  15x 3' holds 15x 3" holds    Doorway pec stretch    3x20" holds 3x20" holds 3x20" holds 3x20" holds  3x20" holds    UT ed      10x 5 lbs 15x 8 lbs  2x10 8 lbs   Repeated R sidebend      10x  reviewed    sidelying ER 2x8 super fatigued 2x8  2x8 2x10 2x10 2x8 10x, 8x 2x10   Ther Activity                                 Gait Training                                 Modalities

## 2023-10-18 ENCOUNTER — OFFICE VISIT (OUTPATIENT)
Dept: PHYSICAL THERAPY | Facility: CLINIC | Age: 58
End: 2023-10-18
Payer: COMMERCIAL

## 2023-10-18 DIAGNOSIS — M54.2 NECK PAIN: ICD-10-CM

## 2023-10-18 DIAGNOSIS — Z47.89 AFTERCARE FOLLOWING SURGERY OF THE MUSCULOSKELETAL SYSTEM: Primary | ICD-10-CM

## 2023-10-18 PROCEDURE — 97112 NEUROMUSCULAR REEDUCATION: CPT | Performed by: PHYSICAL THERAPIST

## 2023-10-18 PROCEDURE — 97110 THERAPEUTIC EXERCISES: CPT | Performed by: PHYSICAL THERAPIST

## 2023-10-18 PROCEDURE — 97140 MANUAL THERAPY 1/> REGIONS: CPT | Performed by: PHYSICAL THERAPIST

## 2023-10-18 NOTE — PROGRESS NOTES
Daily Note     Today's date: 10/18/2023  Patient name: Dylan Arauz  : 1965  MRN: [id number]  Referring provider: Kimmy Garcia  Dx:   Encounter Diagnosis     ICD-10-CM    1. Aftercare following surgery of the musculoskeletal system  Z47.89       2. Neck pain  M54.2                      Subjective: Patient notes didn't sleep well. Objective: See treatment diary below      Assessment: Tolerated treatment well. Patient would benefit from continued PT. Manuals addressing joint restrictions with constant hypertonic scalenes focus this visit. Plan: Progress treatment as tolerated. Precautions: s/p C4-C5, C5-C6 ACDF on 23 with history of cervical myelopathy with myelomalacia. Precautions no lifting more than gallon of milk.  Prefers to lie SEMIRECUMBENT    Manuals 9/29 10/2 10/4 10/6 10/10 10/12 10/16 10/18   Cervical mx work [address], [address] [place] [address], [address] [facility] [address], 3 [facility] [address], 3 [facility] [address], 3 [facility] [address], 3 [place] and upper cervical gentle mobs R [address], 3 [facility] [address], 3 [place]   R shoulder PRom           R shoulder mx work           scap mobs [address], 3 [facility] [address], 3 [facility] [address], 3 [facility] [address], 3 [facility] [address], 3 [facility] [address], 3 [place] w/ post dep PNF  [address], 3 [place]   Semi recumbent cerv dist/retraction           2nd rib mobs      Seated mk [address], 3 [facility] [address], 3 [place]   Thoracic mobs [address], 3 [facility]  [address], 3 [facility] [address], 3 [facility] [address], 3 [facility] [address], 3 [place] S/l Mk S/l [address], 3 [place]   Neuro Re-Ed            Chin tucks 15x 3" holds w/ retraction, 10x 10" holds chin tucks 15x 3" holds w/ retraction supine, 10x seated  2x10 3" holds w/ retraction 2x10 3" holds w/ retraction 6x 1" hold w/ hand lift off 2x10 3" holds w/ retraction w/ symptoms on and off 2x15 3" holds w/ retraction 2x10 3" holds w/ retraction    scap 4       2x8  blue rows  2x10 blue rows   S/l mid/low trap PNF MK                                                      Ther Ex           UBE or treadmill 5 min  5 min  5 min  5 min  5 min 5 min  5 min 5 min TM   scap retraction           Wall slides 10x  10x table slides 12x table slides  12x table slides 15x  15x  15x  15x    Self first rib mob   10x 3" holds w/ shear  12x 3" holds w/ shear       Shoulder isometrics 5x 5" holds Er/IR 5x 5" holds ER/IR 5x 5" holds ER/IR  5x 5" holds ER/IR 5x 5" holds ER/Ir  5x 5" holds Er/IR 5x 5" holds ER/IR 6x 5" holds Er/ir   No moneys  10x  10x orange 10x orange       Standing shoulder ER/IR           Seated thoracic extension  10x 12x 3" holds 15x 3" holds 15x 3" holds  15x 3' holds 15x 3" holds  15x 5" holds   Doorway pec stretch   3x20" holds 3x20" holds 3x20" holds 3x20" holds  3x20" holds  3x20" holds   UT ed     10x 5 lbs 15x 8 lbs  2x10 8 lbs 2x10 9 lbs   Repeated R sidebend     10x  reviewed     Supine L cerv SB w/ scap dep/retr        5x 10" holds   sidelying ER 2x8  2x8 2x10 2x10 2x8 10x, 8x 2x10 2x10   Ther Activity                                 Gait Training                                 Modalities

## 2023-10-20 ENCOUNTER — OFFICE VISIT (OUTPATIENT)
Dept: PHYSICAL THERAPY | Facility: CLINIC | Age: 58
End: 2023-10-20
Payer: COMMERCIAL

## 2023-10-20 DIAGNOSIS — Z47.89 AFTERCARE FOLLOWING SURGERY OF THE MUSCULOSKELETAL SYSTEM: Primary | ICD-10-CM

## 2023-10-20 DIAGNOSIS — M54.2 NECK PAIN: ICD-10-CM

## 2023-10-20 PROCEDURE — 97110 THERAPEUTIC EXERCISES: CPT

## 2023-10-20 PROCEDURE — 97140 MANUAL THERAPY 1/> REGIONS: CPT

## 2023-10-20 PROCEDURE — 97112 NEUROMUSCULAR REEDUCATION: CPT

## 2023-10-20 NOTE — PROGRESS NOTES
Daily Note     Today's date: 10/20/2023  Patient name: Lexus Hendrickson  : 1965  MRN: [id number]  Referring provider: Messi Wolfe  Dx:   Encounter Diagnosis     ICD-10-CM    1. Aftercare following surgery of the musculoskeletal system  Z47.89       2. Neck pain  M54.2             Start Time: 5496  Stop Time: 0830  Total time in clinic (min): 40 minutes    Subjective: Patient notes "it is a bad day" with neck and R shoulder pain. Reports she also has a bad headache. Objective: See treatment diary below      Assessment: Tolerated treatment well. Patient would benefit from continued PT. Focused session on reducing R sided cervical and scapular muscle tension with decreased tension and relief noted following. Plan: Progress treatment as tolerated. Precautions: s/p C4-C5, C5-C6 ACDF on 23 with history of cervical myelopathy with myelomalacia. Precautions no lifting more than gallon of milk.  Prefers to lie SEMIRECUMBENT    Manuals 9/29 10/2 10/4 10/6 10/10 10/12 10/16 10/18 10/20   Cervical mx work [address], [address] [place] [address], [address] [place] [address], [address] [place] [address], [address] [place] [address], [address] [place] [address], 3 [place] and upper cervical gentle mobs R [address], [address] [place] [address], [address] [place] [address], 3 [place]   R shoulder PRom            R shoulder mx work            scap mobs [address], [address] [place] [address], [address] [place] [address], [address] [place] [address] [place] [address], [address] [place] [address], 3 [place] w/ post dep PNF  [address], [address] [place] [address], 3 [place]   Semi recumbent cerv dist/retraction            2nd rib mobs      Seated mk [address], [address] [place] [address], [address] [place]    Thoracic mobs [address], [address] [place]  [address], [address] [place] [address], [address] [place] [address], [address] [place] [address], 3 [place] S/l Mk S/l [address], 3 [place] S/l mk   Neuro Re-Ed             Chin tucks 15x 3" holds w/ retraction, 10x 10" holds chin tucks 15x 3" holds w/ retraction supine, 10x seated  2x10 3" holds w/ retraction 2x10 3" holds w/ retraction 6x 1" hold w/ hand lift off 2x10 3" holds w/ retraction w/ symptoms on and off 2x15 3" holds w/ retraction 2x10 3" holds w/ retraction  2x10 3" holds w/ retraction    scap 4       2x8  blue rows  2x10 blue rows 2x10 blue rows   S/l mid/low trap PNF MK                                                           Ther Ex            UBE or treadmill 5 min  5 min  5 min  5 min  5 min 5 min  5 min 5 min TM 5 min TM scap retraction            Wall slides 10x  10x table slides 12x table slides  12x table slides 15x  15x  15x  15x  15x table   Self first rib mob   10x 3" holds w/ shear  12x 3" holds w/ shear        Shoulder isometrics 5x 5" holds Er/IR 5x 5" holds ER/IR 5x 5" holds ER/IR  5x 5" holds ER/IR 5x 5" holds ER/Ir  5x 5" holds Er/IR 5x 5" holds ER/IR 6x 5" holds Er/ir 6x 5" holds Er/ir   No moneys  10x  10x orange 10x orange        Standing shoulder ER/IR            Seated thoracic extension  10x 12x 3" holds 15x 3" holds 15x 3" holds  15x 3' holds 15x 3" holds  15x 5" holds 15x 5" holds   Doorway pec stretch   3x20" holds 3x20" holds 3x20" holds 3x20" holds  3x20" holds  3x20" holds 3x20" holds   UT ed     10x 5 lbs 15x 8 lbs  2x10 8 lbs 2x10 9 lbs 2x10 9 lbs   Repeated R sidebend     10x  reviewed      Supine L cerv SB w/ scap dep/retr        5x 10" holds    sidelying ER 2x8  2x8 2x10 2x10 2x8 10x, 8x 2x10 2x10 2x10   Ther Activity                                    Gait Training                                    Modalities

## 2023-10-30 ENCOUNTER — OFFICE VISIT (OUTPATIENT)
Dept: PHYSICAL THERAPY | Facility: CLINIC | Age: 58
End: 2023-10-30
Payer: COMMERCIAL

## 2023-10-30 ENCOUNTER — OFFICE VISIT (OUTPATIENT)
Dept: SURGERY | Facility: CLINIC | Age: 58
End: 2023-10-30
Payer: COMMERCIAL

## 2023-10-30 DIAGNOSIS — Z47.89 AFTERCARE FOLLOWING SURGERY OF THE MUSCULOSKELETAL SYSTEM: Primary | ICD-10-CM

## 2023-10-30 DIAGNOSIS — M54.2 NECK PAIN: ICD-10-CM

## 2023-10-30 DIAGNOSIS — M12.811 ROTATOR CUFF TEAR ARTHROPATHY OF RIGHT SHOULDER: ICD-10-CM

## 2023-10-30 DIAGNOSIS — M48.02 CERVICAL STENOSIS OF SPINAL CANAL: ICD-10-CM

## 2023-10-30 DIAGNOSIS — M54.12 CERVICAL RADICULOPATHY: ICD-10-CM

## 2023-10-30 DIAGNOSIS — M75.01 ADHESIVE CAPSULITIS OF RIGHT SHOULDER: Primary | ICD-10-CM

## 2023-10-30 DIAGNOSIS — G95.89 MYELOMALACIA (CMS/HCC): ICD-10-CM

## 2023-10-30 DIAGNOSIS — G95.9 MYELOPATHY (CMS/HCC): ICD-10-CM

## 2023-10-30 DIAGNOSIS — M75.101 ROTATOR CUFF TEAR ARTHROPATHY OF RIGHT SHOULDER: ICD-10-CM

## 2023-10-30 DIAGNOSIS — M25.511 RIGHT SHOULDER PAIN, UNSPECIFIED CHRONICITY: ICD-10-CM

## 2023-10-30 PROCEDURE — 99213 OFFICE O/P EST LOW 20 MIN: CPT | Performed by: ORTHOPAEDIC SURGERY

## 2023-10-30 PROCEDURE — 97112 NEUROMUSCULAR REEDUCATION: CPT

## 2023-10-30 PROCEDURE — 97164 PT RE-EVAL EST PLAN CARE: CPT

## 2023-10-30 PROCEDURE — 97110 THERAPEUTIC EXERCISES: CPT

## 2023-10-30 PROCEDURE — 97140 MANUAL THERAPY 1/> REGIONS: CPT

## 2023-10-30 NOTE — PROGRESS NOTES
Subjective   Patient ID: Ame Gipson is a 57 y.o. female.    Chief Complaint : 1. C4-C6 ACDF on 7/26/2023  2.  Right shoulder pain limited range of motion    History of Present Illness: Ame Gipson is a 57 y.o. female who presents to the office today with the above stated complaints.  She states she was doing quite well the first 6 weeks postoperatively until the beginning of October when she began experiencing right shoulder pain and difficulty with range of motion.  She has been compliant in physical therapy however this new pain in her right shoulder has limited her progress.  She feels as though her gait disturbance and balance issues are continuing to improve, however she notes that after walking for a bit her legs feel tired.  For her right shoulder pain she has utilized gabapentin which mildly helps with her symptoms.  She is having difficulty with range of motion of her shoulder and is having difficulty performing ADLs like combing her hair because of this.  Having difficulty with overhead motions.  She denies any bowel or bladder changes any recent fevers chills night sweats recent trauma or illness.  She does endorse gait disturbance and balance issues, however notes that this is improved since preoperative timeframe.    Past Medical History :  Past Medical History:   Diagnosis Date    Lipid disorder          Past Surgical History :   Past Surgical History:   Procedure Laterality Date    CHOLECYSTECTOMY      ENDOMETRIAL ABLATION         Family History :  Family History   Problem Relation Age of Onset    Hyperlipidemia Biological Mother     Alcohol abuse Biological Father        Social History :  Social History     Social History Narrative    Lives with  in a 2 story home.       REVIEW OF SYSTEMS : All others negative other than specified in the HPI.    Physical Examination : There were no vitals taken for this visit.    Physical Exam:  General: No acute distress  Neuro: Awake, Alert, Oriented  x 3  Eyes: Pupils equal and round  Mouth: Oropharynx clear  Respiratory: Breathing unlabored  Cardio: no edema or cyanosis  Skin: no rashes    There is no erythema, edema, or other lesions noted over the Rightshoulder. There is no tenderness to palpation over the Rightsternoclavicular joint, clavicle, acromioclavicular joint.  Tenderness to palpation about the trapezius, or deltoid.  Active and passive ROM is decreased to 90 degrees of flexion and abduction. Rotator cuff strength is 2-3/5 in testing of the supraspinatus. Sensation to light touch is grossly intact throughout the Rightarm. Radial pulses are 2+, and capillary refill is brisk in all fingers.    The patient has discomfort with Neer and Almaraz impingement maneuvers.    The remainder of the right upper extremity exam is within normal limits with the exception of interosseous strength of the right hand graded at a 4 out of 5    IMAGING : MRI of the cervical spine was personally reviewed which demonstrates status post C4-C6 ACDF.  Osteophyte complex at C5-6 contributing to moderate foraminal stenosis right-sided    IMPRESSION : 1.  Cervical radiculopathy  2.  Status post ACDF C4-C6 on 7/26/2023  3.  Adhesive capsulitis right shoulder  4.  Rotator cuff arthropathy and tear right shoulder    PLAN : At this time we discussed the patient's history and physical exam in detail.  We discussed her diagnosis of myelomalacia as a relates to the progression to what she can expect with physical therapy after her cervical surgery.  We also discussed her new right upper extremity symptoms in her shoulder as having an etiology of her within her shoulder itself.  We talked about adhesive capsulitis as well as a possible rotator cuff and provided her with a prescription for an MRI of her right shoulder.  We also provided her with a prescription for physical therapy to evaluate and treat her right shoulder.  She was instructed to follow-up with Dr. Rizo for a right  shoulder evaluation once he obtains his MRI, however may return sooner should her condition worsen.    ANTONELLA Mack  10/30/2023  10:54 AM

## 2023-10-30 NOTE — PROGRESS NOTES
PT Re-evaluation     Today's date: 10/30/2023  Patient name: Yuki Bucio  : 1965  MRN: [id number]  Referring provider: Apollo Lopez  Dx:   Encounter Diagnosis     ICD-10-CM    1. Aftercare following surgery of the musculoskeletal system  Z47.89       2. Neck pain  M54.2       3. Right shoulder pain, unspecified chronicity  M25.511               Start Time: 1615  Stop Time: 1700  Total time in clinic (min): 45 minutes    Subjective: Patient notes she saw her doctor who diagnosed her with R frozen shoulder and to add this to PT. Reports she got an MRI on her shoulders and is awaiting the results. Reports R shoulder "heaviness" when moving her arm and "pinching" in her R anterior shoulder. Reports also N/T in her R shoulder and entire RUE that worsens when her neck feels "tighter". Reports she feels more shoulder pain when her neck pain is more severe as well. Objective: See treatment diary below    R shoulder PROM:  Flexion: 170 deg  Abduction: 100 deg with pain  ER: 50 deg  IR: 45 deg    R shoulder AROM:  Flexion: 60 deg with pain and weakness  Abduction: 30 deg pain and weakness  ER: 45 deg  IR: PSIS    R shoulder strength:   Flexion: 3-/5  Abduction: 2+/5  ER: 3/5  IR: 3/5    L shoulder AROM/strength: [facility] all directions no pain    Anterior shoulder pain with abduction PROM and flexion/abduction AROM    Palpation: TTP R UT, scalenes, supraspinatus, infraspinatus with referral down RUE    Goals  STG  1. Patient will be independent with completion of HEP throughout therapy - goal met  2. Patient will have at worst 5/10 pain so patient can sleep with less discomfort in 3 weeks. LTG - in progress  1. Patient will increase R cervical rotation and extension to at least moderate restriction in 4 weeks. - in progress  2. Patient will increase R shoulder strength at least 1/2 grade so patient can use her R UE for functional tasks with less difficulty in 6 weeks.  - in progress    Assessment: Tolerated treatment well. Patient would benefit from continued PT. Re-eval performed this session to add R shoulder diagnosis. Further assessed R shoulder ROM and strength this session with good PROM noted however significant limitations noted with R shoulder AROM and strength due to weakness indicating possible rotator cuff pathology. Plan to continue treating cervical and R shoulder symptoms to achieve all goals and maximize functional mobility. Plan: Progress treatment as tolerated. Precautions: s/p C4-C5, C5-C6 ACDF on 7/26/23 with history of cervical myelopathy with myelomalacia. Precautions no lifting more than gallon of milk.  Prefers to lie SEMIRECUMBENT    Manuals 9/29 10/2 10/4 10/6 10/10 10/12 10/16 10/18 10/20 10/30   Cervical mx work [address], [address] [place] [address], [address] [place] [address], 3 [place] [address], 3 [place] [address], [address] [place] [address], 3 [place] and upper cervical gentle mobs R [address], 3 [place] [address], 3 [place] [address], 3 [place] [address], 3 [place]   R shoulder PRom          [address], 3 [place]   R shoulder mx work             scap mobs [address], 3 [place] [address], 3 [place] [address], [address] [place] [address], 3 [place] [address], [address] [place] [address], 3 [place] w/ post dep PNF  [address], 3 [facility] [address], [address] [place]    Semi recumbent cerv dist/retraction             2nd rib mobs      Seated mk [address], [address] [place] [address], 3 [place]     Thoracic mobs [address], 3 [place]  [address], [address] [place] [address], 3 [facility] [address], 3 [facility] [address], 3 [place] S/l Mk S/l [address], 3 [place] S/l mk    Neuro Re-Ed              Chin tucks 15x 3" holds w/ retraction, 10x 10" holds chin tucks 15x 3" holds w/ retraction supine, 10x seated  2x10 3" holds w/ retraction 2x10 3" holds w/ retraction 6x 1" hold w/ hand lift off 2x10 3" holds w/ retraction w/ symptoms on and off 2x15 3" holds w/ retraction 2x10 3" holds w/ retraction  2x10 3" holds w/ retraction  2x10 3" holds w/ retraction    scap 4       2x8  blue rows  2x10 blue rows 2x10 blue rows    S/l mid/low trap PNF MK                                                                Ther Ex             UBE or treadmill 5 min  5 min  5 min  5 min  5 min 5 min  5 min 5 min TM 5 min TM 5 min TM   Pulley          10"x10   scap retraction             Wall slides 10x  10x table slides 12x table slides  12x table slides 15x  15x  15x  15x  15x table 15x table   Self first rib mob   10x 3" holds w/ shear  12x 3" holds w/ shear         Shoulder isometrics 5x 5" holds Er/IR 5x 5" holds ER/IR 5x 5" holds ER/IR  5x 5" holds ER/IR 5x 5" holds ER/Ir  5x 5" holds Er/IR 5x 5" holds ER/IR 6x 5" holds Er/ir 6x 5" holds Er/ir    No moneys  10x  10x orange 10x orange         Standing shoulder ER/IR             Seated thoracic extension  10x 12x 3" holds 15x 3" holds 15x 3" holds  15x 3' holds 15x 3" holds  15x 5" holds 15x 5" holds    Doorway pec stretch   3x20" holds 3x20" holds 3x20" holds 3x20" holds  3x20" holds  3x20" holds 3x20" holds 3x20" holds   UT ed     10x 5 lbs 15x 8 lbs  2x10 8 lbs 2x10 9 lbs 2x10 9 lbs 2x10 9 lbs   Repeated R sidebend     10x  reviewed       Supine L cerv SB w/ scap dep/retr        5x 10" holds     sidelying ER 2x8  2x8 2x10 2x10 2x8 10x, 8x 2x10 2x10 2x10    Ther Activity                                       Gait Training                                       Modalities

## 2023-11-01 ENCOUNTER — APPOINTMENT (OUTPATIENT)
Dept: PHYSICAL THERAPY | Facility: CLINIC | Age: 58
End: 2023-11-01
Payer: COMMERCIAL

## 2023-11-03 ENCOUNTER — OFFICE VISIT (OUTPATIENT)
Dept: PHYSICAL THERAPY | Facility: CLINIC | Age: 58
End: 2023-11-03
Payer: COMMERCIAL

## 2023-11-03 DIAGNOSIS — Z47.89 AFTERCARE FOLLOWING SURGERY OF THE MUSCULOSKELETAL SYSTEM: Primary | ICD-10-CM

## 2023-11-03 DIAGNOSIS — M25.511 RIGHT SHOULDER PAIN, UNSPECIFIED CHRONICITY: ICD-10-CM

## 2023-11-03 DIAGNOSIS — M54.2 NECK PAIN: ICD-10-CM

## 2023-11-03 PROCEDURE — 97112 NEUROMUSCULAR REEDUCATION: CPT

## 2023-11-03 PROCEDURE — 97110 THERAPEUTIC EXERCISES: CPT

## 2023-11-03 PROCEDURE — 97140 MANUAL THERAPY 1/> REGIONS: CPT

## 2023-11-08 ENCOUNTER — OFFICE VISIT (OUTPATIENT)
Dept: PHYSICAL THERAPY | Facility: CLINIC | Age: 58
End: 2023-11-08
Payer: COMMERCIAL

## 2023-11-08 DIAGNOSIS — M54.2 NECK PAIN: ICD-10-CM

## 2023-11-08 DIAGNOSIS — Z47.89 AFTERCARE FOLLOWING SURGERY OF THE MUSCULOSKELETAL SYSTEM: Primary | ICD-10-CM

## 2023-11-08 DIAGNOSIS — M25.511 RIGHT SHOULDER PAIN, UNSPECIFIED CHRONICITY: ICD-10-CM

## 2023-11-08 PROCEDURE — 97112 NEUROMUSCULAR REEDUCATION: CPT

## 2023-11-08 PROCEDURE — 97140 MANUAL THERAPY 1/> REGIONS: CPT

## 2023-11-08 PROCEDURE — 97110 THERAPEUTIC EXERCISES: CPT

## 2023-11-08 NOTE — PROGRESS NOTES
Daily Note    Today's date: 2023  Patient name: Drake Dill  : 1965  MRN: [id number]  Referring provider: Hussein Pepper  Dx:   Encounter Diagnosis     ICD-10-CM    1. Aftercare following surgery of the musculoskeletal system  Z47.89       2. Neck pain  M54.2       3. Right shoulder pain, unspecified chronicity  M25.511                   Start Time: 08  Stop Time: 920  Total time in clinic (min): 50 minutes    Subjective: Patient notes "I am really stiff today and I have a headache". Reports she is getting a shoulder MRI today. Objective: See treatment diary below        Assessment: Tolerated treatment well. Patient would benefit from continued PT. Most muscle tension noted in her R scalenes this session, so this was addressed with STM and scalene stretch was added to further address this tightness. Continues to be limited in R shoulder PROM with most reactivity noted with abduction. Plan: Progress treatment as tolerated. Precautions: s/p C4-C5, C5-C6 ACDF on 23 with history of cervical myelopathy with myelomalacia. Precautions no lifting more than gallon of milk.  Prefers to lie SEMIRECUMBENT    Manuals 10/12 10/16 10/18 10/20 10/30 11/3 11/8   Cervical mx work [address], 3 [place] and upper cervical gentle mobs R [address], [address] [place] [address], 3 [place] [address], 3 [place] [address], 3 [place] [address], 3 [place] [address], 3 [place]   R shoulder PRom     [address], 3 [place] [address], [address] [place] [address], 3 [place]   R shoulder mx work      [address], [address] [place] [address], 3 [place]   scap mobs [address], 3 [place] w/ post dep PNF  [address], [address] [place] [address], 3 [place]      Semi recumbent cerv dist/retraction          2nd rib mobs Seated mk [address], 3 [place] [address], 3 [place]       Thoracic mobs [address], 3 [place] S/l Mk S/l [address], 3 [place] S/l mk      Neuro Re-Ed          Chin tucks 2x10 3" holds w/ retraction w/ symptoms on and off 2x15 3" holds w/ retraction 2x10 3" holds w/ retraction  2x10 3" holds w/ retraction  2x10 3" holds w/ retraction  2x10 3" holds w/ retraction  2x10 3" holds w/ retraction    scap 4  2x8  blue rows  2x10 blue rows 2x10 blue rows  2x10 blue rows 2x10 blue rows   S/l mid/low trap PNF                                                  Ther Ex          UBE or treadmill 5 min  5 min 5 min TM 5 min TM 5 min TM 5 min TM 5 min TM   Pulley     10"x10     scap retraction          Wall slides 15x  15x  15x  15x table 15x table 15x table 15x table   Self first rib mob          Shoulder isometrics 5x 5" holds Er/IR 5x 5" holds ER/IR 6x 5" holds Er/ir 6x 5" holds Er/ir      No moneys          Standing shoulder ER/IR          Seated thoracic extension 15x 3' holds 15x 3" holds  15x 5" holds 15x 5" holds      Doorway pec stretch 3x20" holds  3x20" holds  3x20" holds 3x20" holds 3x20" holds 3x20" holds    UT ed 15x 8 lbs  2x10 8 lbs 2x10 9 lbs 2x10 9 lbs 2x10 9 lbs 2x10 9 lbs 2x10 9 lbs   Repeated R sidebend reviewed         Supine L cerv SB w/ scap dep/retr   5x 10" holds       R scalene stretch       3x10"   sidelying ER 10x, 8x 2x10 2x10 2x10  2x10 2x10   Ther Activity                              Gait Training                              Modalities

## 2023-11-10 ENCOUNTER — OFFICE VISIT (OUTPATIENT)
Dept: PHYSICAL THERAPY | Facility: CLINIC | Age: 58
End: 2023-11-10
Payer: COMMERCIAL

## 2023-11-10 DIAGNOSIS — M25.511 RIGHT SHOULDER PAIN, UNSPECIFIED CHRONICITY: ICD-10-CM

## 2023-11-10 DIAGNOSIS — Z47.89 AFTERCARE FOLLOWING SURGERY OF THE MUSCULOSKELETAL SYSTEM: Primary | ICD-10-CM

## 2023-11-10 DIAGNOSIS — M54.2 NECK PAIN: ICD-10-CM

## 2023-11-10 PROCEDURE — 97140 MANUAL THERAPY 1/> REGIONS: CPT

## 2023-11-10 PROCEDURE — 97110 THERAPEUTIC EXERCISES: CPT

## 2023-11-10 PROCEDURE — 97112 NEUROMUSCULAR REEDUCATION: CPT

## 2023-11-10 NOTE — PROGRESS NOTES
Daily Note    Today's date: 11/10/2023  Patient name: Drew Pedroza  : 1965  MRN: [id number]  Referring provider: Becca Hughes  Dx:   Encounter Diagnosis     ICD-10-CM    1. Aftercare following surgery of the musculoskeletal system  Z47.89       2. Neck pain  M54.2       3. Right shoulder pain, unspecified chronicity  M25.511                     Start Time: 08  Stop Time: 0915  Total time in clinic (min): 45 minutes    Subjective: Patient reports she had a lot of R shoulder soreness after last session but it felt a little better and less tight yesterday and today. Reports she feels more tightness when she wakes up in the mornings. Objective: See treatment diary below        Assessment: Tolerated treatment well. Patient would benefit from continued PT. Improved R sided cervical and shoulder muscle flexibility noted this session with further improvements noted following manuals. Most tightness noted in her R scalenes, UT, and anterior shoulder musculature with referral down her RUE suggesting medial and/or radial nerve involvement based on referral pattern. Plan: Progress treatment as tolerated. Precautions: s/p C4-C5, C5-C6 ACDF on 23 with history of cervical myelopathy with myelomalacia. Precautions no lifting more than gallon of milk.  Prefers to lie SEMIRECUMBENT    Manuals 10/12 10/16 10/18 10/20 10/30 11/3 11/8 11/10   Cervical mx work [address], 3 [place] and upper cervical gentle mobs R [address], 3 [place] [address], 3 [place] [address], 3 [place] [address], 3 [place] [address], 3 [place] [address], 3 [place] [address], 3 [place]   R shoulder PRom     [address], 3 [place] [address], [address] [place] [address], 3 [place] [address], 3 [place]   R shoulder mx work      [address], [address] [place] [address], [address] [place] [address], 3 [place]   scap mobs [address], 3 [place] w/ post dep PNF  [address], [address] [place] [address], 3 [place]       Semi recumbent cerv dist/retraction           2nd rib mobs Seated mk [address], [address] [place] [address], 3 [place]        Thoracic mobs [address], 3 [place] S/l Mk S/l [address], 3 [place] S/l mk       Neuro Re-Ed           Chin tucks 2x10 3" holds w/ retraction w/ symptoms on and off 2x15 3" holds w/ retraction 2x10 3" holds w/ retraction  2x10 3" holds w/ retraction  2x10 3" holds w/ retraction  2x10 3" holds w/ retraction  2x10 3" holds w/ retraction  2x10 3" holds w/ retraction    scap 4  2x8  blue rows  2x10 blue rows 2x10 blue rows  2x10 blue rows 2x10 blue rows 2x10 blue rows   S/l mid/low trap PNF                                                       Ther Ex           UBE or treadmill 5 min  5 min 5 min TM 5 min TM 5 min TM 5 min TM 5 min TM 5 min TM   Pulley     10"x10   10"x10   scap retraction           Wall slides 15x  15x  15x  15x table 15x table 15x table 15x table 15x table   Self first rib mob           Shoulder isometrics 5x 5" holds Er/IR 5x 5" holds ER/IR 6x 5" holds Er/ir 6x 5" holds Er/ir       No moneys           Standing shoulder ER/IR           Seated thoracic extension 15x 3' holds 15x 3" holds  15x 5" holds 15x 5" holds       Doorway pec stretch 3x20" holds  3x20" holds  3x20" holds 3x20" holds 3x20" holds 3x20" holds     UT ed 15x 8 lbs  2x10 8 lbs 2x10 9 lbs 2x10 9 lbs 2x10 9 lbs 2x10 9 lbs 2x10 9 lbs 2x10 9 lbs   Repeated R sidebend reviewed          Supine L cerv SB w/ scap dep/retr   5x 10" holds        R scalene stretch       3x10" 3x10"   sidelying ER 10x, 8x 2x10 2x10 2x10  2x10 2x10 2x10   Ther Activity                                 Gait Training                                 Modalities

## 2023-11-13 ENCOUNTER — OFFICE VISIT (OUTPATIENT)
Dept: PHYSICAL THERAPY | Facility: CLINIC | Age: 58
End: 2023-11-13
Payer: COMMERCIAL

## 2023-11-13 DIAGNOSIS — Z47.89 AFTERCARE FOLLOWING SURGERY OF THE MUSCULOSKELETAL SYSTEM: Primary | ICD-10-CM

## 2023-11-13 DIAGNOSIS — M25.511 RIGHT SHOULDER PAIN, UNSPECIFIED CHRONICITY: ICD-10-CM

## 2023-11-13 DIAGNOSIS — M54.2 NECK PAIN: ICD-10-CM

## 2023-11-13 PROCEDURE — 97140 MANUAL THERAPY 1/> REGIONS: CPT

## 2023-11-13 PROCEDURE — 97112 NEUROMUSCULAR REEDUCATION: CPT

## 2023-11-13 PROCEDURE — 97110 THERAPEUTIC EXERCISES: CPT

## 2023-11-13 NOTE — PROGRESS NOTES
Daily Note    Today's date: 2023  Patient name: Lalitha Ohara  : 1965  MRN: [id number]  Referring provider: Riley Valentine  Dx:   Encounter Diagnosis     ICD-10-CM    1. Aftercare following surgery of the musculoskeletal system  Z47.89       2. Neck pain  M54.2       3. Right shoulder pain, unspecified chronicity  M25.511                       Start Time: 0845  Stop Time: 09  Total time in clinic (min): 45 minutes    Subjective: Patient reports her neck and shoulder pain is a 5-6/10 today which "is less than usual". Reports some improvements in tightness and RUE burning pain. Reports she rested a lot yesterday which seemed to help as well. Objective: See treatment diary below        Assessment: Tolerated treatment well. Patient would benefit from continued PT. Decreased muscle tension noted in her R cervical and shoulder musculature today compared to previous sessions. Initiated median nerve flossing to further address RUE radicular symptoms. Also noted improved R shoulder PROM this session. Muscle fatigue noted with table slides and side lying shoulder ER. Plan: Progress treatment as tolerated. Precautions: s/p C4-C5, C5-C6 ACDF on 23 with history of cervical myelopathy with myelomalacia. Precautions no lifting more than gallon of milk.  Prefers to lie SEMIRECUMBENT    Manuals 10/12 10/16 10/18 10/20 10/30 11/3 11/8 11/10 11/13   Cervical mx work [address], [address] [place] and upper cervical gentle mobs R [address], [address] [place] [address], 3 [place] [address], 3 [place] [address], 3 [place] [address], 3 [place] [address], [address] [place] [address], 3 [facility] [address], 3 [place]   R shoulder PRom     [address], [address] [place] [address], 3 [place] [address], 3 [place] [address], [address] [place] [address], 3 [place] and median nerve flossing   R shoulder mx work      [address], [address] [place] [address], 3 [facility] [address], 3 [facility] [address], 3 [place]   scap mobs [address], 3 [place] w/ post dep PNF  [address], [address] [place] [address], 3 [place]        Semi recumbent cerv dist/retraction            2nd rib mobs Seated mk [address], [address] [place] [address], 3 [place]         Thoracic mobs [address], 3 [place] S/l Mk S/l MK S/l mk        Neuro Re-Ed            Chin tucks 2x10 3" holds w/ retraction w/ symptoms on and off 2x15 3" holds w/ retraction 2x10 3" holds w/ retraction  2x10 3" holds w/ retraction  2x10 3" holds w/ retraction  2x10 3" holds w/ retraction  2x10 3" holds w/ retraction  2x10 3" holds w/ retraction  2x10 3" holds w/ retraction    scap 4  2x8  blue rows  2x10 blue rows 2x10 blue rows  2x10 blue rows 2x10 blue rows 2x10 blue rows 2x10 blue rows   S/l mid/low trap PNF                                                            Ther Ex            UBE or treadmill 5 min  5 min 5 min TM 5 min TM 5 min TM 5 min TM 5 min TM 5 min TM 5 min TM   Pulley     10"x10   10"x10 10"x10   scap retraction            Wall slides 15x  15x  15x  15x table 15x table 15x table 15x table 15x table 15x table   Self first rib mob            Shoulder isometrics 5x 5" holds Er/IR 5x 5" holds ER/IR 6x 5" holds Er/ir 6x 5" holds Er/ir        No moneys            Standing shoulder ER/IR            Seated thoracic extension 15x 3' holds 15x 3" holds  15x 5" holds 15x 5" holds        Doorway pec stretch 3x20" holds  3x20" holds  3x20" holds 3x20" holds 3x20" holds 3x20" holds      UT ed 15x 8 lbs  2x10 8 lbs 2x10 9 lbs 2x10 9 lbs 2x10 9 lbs 2x10 9 lbs 2x10 9 lbs 2x10 9 lbs 2x10 9 lbs   Repeated R sidebend reviewed           Supine L cerv SB w/ scap dep/retr   5x 10" holds         R scalene stretch       3x10" 3x10" 3x10"   sidelying ER 10x, 8x 2x10 2x10 2x10  2x10 2x10 2x10 2x10   Ther Activity                                    Gait Training                                    Modalities

## 2023-11-14 ENCOUNTER — APPOINTMENT (OUTPATIENT)
Dept: RADIOLOGY | Age: 58
End: 2023-11-14
Payer: COMMERCIAL

## 2023-11-14 ENCOUNTER — OFFICE VISIT (OUTPATIENT)
Dept: ORTHOPEDICS | Facility: CLINIC | Age: 58
End: 2023-11-14
Payer: COMMERCIAL

## 2023-11-14 DIAGNOSIS — M54.12 CERVICAL RADICULOPATHY: ICD-10-CM

## 2023-11-14 DIAGNOSIS — M75.41 IMPINGEMENT SYNDROME OF SHOULDER, RIGHT: Primary | ICD-10-CM

## 2023-11-14 DIAGNOSIS — M50.00 CERVICAL DISC DISEASE WITH MYELOPATHY: ICD-10-CM

## 2023-11-14 DIAGNOSIS — M75.21 BICEPS TENDINITIS OF RIGHT SHOULDER: ICD-10-CM

## 2023-11-14 PROCEDURE — 20610 DRAIN/INJ JOINT/BURSA W/O US: CPT | Mod: RT | Performed by: STUDENT IN AN ORGANIZED HEALTH CARE EDUCATION/TRAINING PROGRAM

## 2023-11-14 PROCEDURE — 99215 OFFICE O/P EST HI 40 MIN: CPT | Mod: 25 | Performed by: STUDENT IN AN ORGANIZED HEALTH CARE EDUCATION/TRAINING PROGRAM

## 2023-11-14 RX ADMIN — METHYLPREDNISOLONE ACETATE 40 MG: 40 INJECTION, SUSPENSION INTRA-ARTICULAR; INTRALESIONAL; INTRAMUSCULAR; SOFT TISSUE at 13:00

## 2023-11-14 NOTE — PROGRESS NOTES
New Patient Visit    CHIEF COMPLAINT  Chronic right shoulder pain    HISTORY OF PRESENT ILLNESS: Ame is a pleasant 57 year-old right-hand dominant female PMH cervical myelopathy c/b myelomalacia s/p ACDF C4-C6, chronic pain, obesity, migraines presenting with chronic right shoulder pain.  Denies any trauma or inciting event, but she was a long time .  There has been a question for some time of whether her shoulder pain was indeed coming from the shoulder or from the cervical spine.  She underwent an ACDF C4-C6 in July 2023 for myelopathic symptoms in addition to numbness & paresthesias down bilateral UE's.  These radicular symptoms have improved; however, she has residual balance problems, bowel/bladder dysfunction, and difficulty with fine motor skills.  She still has weakness in her bilateral UE's and notes frequently dropping objects.  Her nerve symptoms have been improving since surgery.  She describes a stabbing pain that she localizes to the anterior shoulder that is worse with supination.  This anterior shoulder pain is non-radiating.  She also describes a shooting pain to the right scapula.  These pains were present prior to the surgery and have continued postoperatively.  She has tried heat, ice, Tylenol, gabapentin, and trazodone.  She is currently in physical therapy, but she has difficulty in the shoulder exercises (particularly abduction) due to the pain.  She had a trigger point injection in the past but never an intraarticular or subacromial injection.  She is allergic to ibuprofen; therefore, she does not take NSAID's.      Past Medical History:   Diagnosis Date    Lipid disorder         Past Surgical History:   Procedure Laterality Date    CHOLECYSTECTOMY      ENDOMETRIAL ABLATION         Current Outpatient Medications   Medication Sig Dispense Refill    acetaminophen (TYLENOL) 500 mg tablet Take 1,000 mg by mouth.      albuterol HFA 90 mcg/actuation inhaler Inhalation for 30 Days       amitriptyline (ELAVIL) 10 mg tablet Take 2 tablets by mouth nightly.      baclofen (LIORESAL) 5 mg tablet tablet Take 1 tablet (5 mg total) by mouth 3 (three) times a day. 90 tablet 0    cyclobenzaprine (FLEXERIL) 5 mg tablet Take 2 tablets (10 mg total) by mouth 3 (three) times a day as needed for muscle spasms for up to 5 days. 30 tablet 0    gabapentin (NEURONTIN) 100 mg capsule Take 1 capsule (100 mg total) by mouth nightly. 30 capsule 0    methylPREDNISolone (MEDROL DOSEPACK) 4 mg tablet Take 6 tablets daily for 2 days, 5 tablets daily for 2 days, 4 tablets daily for 2 days, 3 tablets daily for 2 days, 2 tablets daily for 2 days, 1 tablet daily for 2 days 42 tablet 0    methylPREDNISolone (MEDROL DOSEPACK) 4 mg tablet Take 6 tablets daily for 2 days, 5 tablets daily for 2 days, 4 tablets daily for 2 days, 3 tablets daily for 2 days, 2 tablets daily for 2 days, 1 tablet daily for 2 days 42 tablet 0    polyethylene glycol (MIRALAX) 17 gram packet Take 17 g by mouth daily for 3 days. 3 packet 0    senna (SENOKOT) 8.6 mg tablet Take 1 tablet by mouth daily. Purchase Over The Counter  Miralax/Senokot.   Take only if necessary for constipation 30 tablet 0    UBRELVY 50 mg tablet tablet See admin instr.       No current facility-administered medications for this visit.       Social History     Occupational History    Occupation: security sales   Tobacco Use    Smoking status: Former     Packs/day: 1.00     Years: 12.00     Additional pack years: 0.00     Total pack years: 12.00     Types: Cigarettes     Start date:      Quit date:      Years since quittin.8    Smokeless tobacco: Never   Substance and Sexual Activity    Alcohol use: Yes     Alcohol/week: 3.0 standard drinks of alcohol     Types: 3 Standard drinks or equivalent per week    Drug use: Never    Sexual activity: Defer        Review of Systems   Constitutional: Negative.    HENT: Negative.    Respiratory: Negative.   "  Cardiovascular: Negative.    Musculoskeletal: Positive for gait problem, neck pain and neck stiffness.   Neurological: Positive for weakness and numbness.   All other systems reviewed and are negative.       Allergies   Allergen Reactions    Hydrocodone Itching    Povidone-Iodine Rash and Other (see comments)     Pt states local reaction          PHYSICAL EXAM:    Estimated body mass index is 32.42 kg/m² as calculated from the following:    Height as of 7/26/23: 1.702 m (5' 7\").    Weight as of 7/26/23: 93.9 kg (207 lb).  Ame is a well-appearing 57 year-old female in no acute distress.  All vitals are stable.  Mood and affect are appropriate.  Cervical spine examination demonstrates limited ROM.  Provocative maneuvers were deferred given recent ACDF and known myelomalacia.      Right shoulder examination demonstrates full passive range of motion that is smooth without crepitus.  Active FE to 85 degrees and ER to 60 degrees.  Pain and weakness with resisted abduction.  Cuff strength appears intact within her arc of motion.  There is scapular dyskinesis.  Equivocal abdominal compression test.  She has pain anteriorly with positioning for the abdominal compression test.  No drop sign.  No lag sign.  No capsular irritation.  Tenderness over the biceps.  No tenderness of the AC joint or posterior joint line.  Positive Neer, positive Danilo's, positive Speed's.      RADIOGRAPHIC EXAMINATION: An MRI of the right shoulder from 11/8/2023 was reviewed in the office today.  There is a large amount of fluid within the joint and bicipital groove, but also surrounding the joint in the subdeltoid and subcoracoid spaces.  There is tendinosis of the subscapularis, supraspinatus, and infraspinatus tendons.  No full thickness or partial thickness tears of the rotator cuff.  Biceps tenosynovitis.  The study does not show the entire shoulder in the A-P plane, and it barely shows the surface of the glenoid; therefore, I am unable to " make any definitive assessment of the rotator cuff muscle bellies.  There is a small joaquina that is isointense with bone within the anterior subdeltoid effusion that may represent the beginning formation of a loose body.       ASSESSMENT: Diagnoses and all orders for this visit:    Impingement syndrome of shoulder, right (Primary)    Biceps tendinitis of right shoulder    Cervical radiculopathy    Cervical disc disease with myelopathy        PLAN: The findings were reviewed and discussed with the patient in detail today.  The diagnosis, natural history, and treatment options, both nonoperative and operative, were reviewed at length.  I explained the common presentation of cervical issues and shoulder pain and also the overlap between cervical pathology and shoulder pathology.  Ame has a difficult situation with her cervical myelopathy.  I do believe, however, that part of her pain is due to her shoulder.  I explained that the pain she is experiencing anteriorly and with shoulder motion is eminating from the shoulder.  The pain to the scapula, while it could be muscular due to scapular dyskinesis, the pain quality and pattern seems more consistent with cervical radiculopathy (C4) to the scapula.  Since Ame is so painful, I would recommend trying a subacromial injection to help resolve the inflammation.  With some pain relief, she should be able to more effectively participate in physical therapy.  She is amenable with this plan.  She will follow up with ANTONELLA Valles; although, I would be happy to see her if her symptoms continue.      Ame expressed complete understanding and agreement with the plan.  All questions were answered.      Endy Rizo MD

## 2023-11-16 ENCOUNTER — APPOINTMENT (OUTPATIENT)
Dept: PHYSICAL THERAPY | Facility: CLINIC | Age: 58
End: 2023-11-16
Payer: COMMERCIAL

## 2023-11-17 RX ORDER — METHYLPREDNISOLONE ACETATE 40 MG/ML
40 INJECTION, SUSPENSION INTRA-ARTICULAR; INTRALESIONAL; INTRAMUSCULAR; SOFT TISSUE
Status: SHIPPED | OUTPATIENT
Start: 2023-11-14

## 2023-11-17 ASSESSMENT — ENCOUNTER SYMPTOMS
NECK STIFFNESS: 1
NUMBNESS: 1
CARDIOVASCULAR NEGATIVE: 1
CONSTITUTIONAL NEGATIVE: 1
WEAKNESS: 1
RESPIRATORY NEGATIVE: 1
NECK PAIN: 1

## 2023-11-18 NOTE — PROCEDURES
LG Arthrocentesis: R subacromial bursa    Date/Time: 11/14/2023 1:00 PM    Performed by: Endy Rizo MD  Authorized by: Endy Rizo MD    Indications:  Pain  Needle Size:  22 G  Approach:  Lateral  Location:  Shoulder  Site:  R subacromial bursa  Medications:  40 mg methylPREDNISolone acetate 40 mg/mL  Outcome:  Tolerated well, no immediate complications  Procedure discussed: discussed risks, benefits, and alternatives    Consent Given by:  Patient  Prep: patient was prepped and draped in usual sterile fashion     Procedure Note - Shoulder Injection    After informed consent, the patient's right lateral shoulder was sterilized, after which a combination of 1 cc of 40 mg of methylprednisolone, 3 cc's of 1% lidocaine without epinephrine, and 3 cc's of 0.25 bupivacaine was injected into the subacromial space without difficulty.  Patient tolerated this well.  Post-injection risks and care were reviewed at length.  Patient expressed understanding, and all questions were answered.     Endy Rizo MD

## 2023-11-27 ENCOUNTER — OFFICE VISIT (OUTPATIENT)
Dept: PHYSICAL THERAPY | Facility: CLINIC | Age: 58
End: 2023-11-27
Payer: COMMERCIAL

## 2023-11-27 DIAGNOSIS — M25.511 RIGHT SHOULDER PAIN, UNSPECIFIED CHRONICITY: ICD-10-CM

## 2023-11-27 DIAGNOSIS — Z47.89 AFTERCARE FOLLOWING SURGERY OF THE MUSCULOSKELETAL SYSTEM: Primary | ICD-10-CM

## 2023-11-27 DIAGNOSIS — M54.2 NECK PAIN: ICD-10-CM

## 2023-11-27 PROCEDURE — 97110 THERAPEUTIC EXERCISES: CPT

## 2023-11-27 PROCEDURE — 97112 NEUROMUSCULAR REEDUCATION: CPT

## 2023-11-27 PROCEDURE — 97140 MANUAL THERAPY 1/> REGIONS: CPT

## 2023-11-27 NOTE — PROGRESS NOTES
Daily Note    Today's date: 2023  Patient name: Jerome Rose  : 1965  MRN: [id number]  Referring provider: Jt Rojas  Dx:   Encounter Diagnosis     ICD-10-CM    1. Aftercare following surgery of the musculoskeletal system  Z47.89       2. Neck pain  M54.2       3. Right shoulder pain, unspecified chronicity  M25.511                         Start Time: 0845  Stop Time: 09  Total time in clinic (min): 45 minutes    Subjective: Patient reports she got an injection in her R shoulder which helped with her pain for about a week. Reports her R shoulder MRI showed "inflammation and fluid" in her shoulder. Reports increased neck tightness and R shoulder pain today. Reports she has also noticed L arm pain intermittently as well which worsens when she sits with poor posture. Objective: See treatment diary below        Assessment: Tolerated treatment well. Patient would benefit from continued PT. Continued tightness noted in her R sided cervical musculature with increased RUE radicular symptoms. Most difficulty and pain noted with R shoulder abduction and ER PROM and AROM. Added bicep curls this session to improve RUE strength with good tolerance noted. Plan: Progress treatment as tolerated. Precautions: s/p C4-C5, C5-C6 ACDF on 23 with history of cervical myelopathy with myelomalacia. Precautions no lifting more than gallon of milk.  Prefers to lie SEMIRECUMBENT    Manuals 10/12 10/16 10/18 10/20 10/30 11/3 11/8 11/10 11/13 11/27   Cervical mx work [address], [address] [place] and upper cervical gentle mobs R [address], [address] [place] [address], [address] [place] [address], [address] [place] [address], [address] [place] [address], [address] [place] [address], [address] [place]   R shoulder PRom     [address], [address] [place] [address], [address] [place] [address], [address] [place] [address], [address] [place] [address], [address] [place] and median nerve flossing [address], [address] [place] and median nerve flossing   R shoulder mx work      [address], [address] [place] [address], [address] [place] [address], [address] [place] [address], [address] [place] [address], [address] [place]   scap mobs [address], [address] [place] w/ post dep PNF  [address], [address] [place] [address], [address] [place]         Semi recumbent cerv dist/retraction             2nd rib mobs Seated mk [address], [address] [place] [address], [address] [place]          Thoracic mobs [address], [address] [place] S/l Mk S/l [address], [address] [place] S/l mk         Neuro Re-Ed             Chin tucks 2x10 3" holds w/ retraction w/ symptoms on and off 2x15 3" holds w/ retraction 2x10 3" holds w/ retraction  2x10 3" holds w/ retraction  2x10 3" holds w/ retraction  2x10 3" holds w/ retraction  2x10 3" holds w/ retraction  2x10 3" holds w/ retraction  2x10 3" holds w/ retraction  2x10 3" holds w/ retraction    scap 4  2x8  blue rows  2x10 blue rows 2x10 blue rows  2x10 blue rows 2x10 blue rows 2x10 blue rows 2x10 blue rows 2x10 blue rows   S/l mid/low trap PNF                                                                 Ther Ex             UBE or treadmill 5 min  5 min 5 min TM 5 min TM 5 min TM 5 min TM 5 min TM 5 min TM 5 min TM 5 min TM   Pulley     10"x10   10"x10 10"x10 10"x12   scap retraction             Wall slides 15x  15x  15x  15x table 15x table 15x table 15x table 15x table 15x table 15x table   Self first rib mob             Shoulder isometrics 5x 5" holds Er/IR 5x 5" holds ER/IR 6x 5" holds Er/ir 6x 5" holds Er/ir         R shoulder flexion and ER AAROM          nv   Standing shoulder ER/IR             Seated thoracic extension 15x 3' holds 15x 3" holds  15x 5" holds 15x 5" holds         Doorway pec stretch 3x20" holds  3x20" holds  3x20" holds 3x20" holds 3x20" holds 3x20" holds    3x20" holds   UT ed 15x 8 lbs  2x10 8 lbs 2x10 9 lbs 2x10 9 lbs 2x10 9 lbs 2x10 9 lbs 2x10 9 lbs 2x10 9 lbs 2x10 9 lbs 2x10 9 lbs   Repeated R sidebend reviewed            Supine L cerv SB w/ scap dep/retr   5x 10" holds          Bicep curls          1lb 2x10   R scalene stretch       3x10" 3x10" 3x10"    sidelying ER 10x, 8x 2x10 2x10 2x10  2x10 2x10 2x10 2x10 2x10   Ther Activity                                       Gait Training                                       Modalities

## 2023-11-29 ENCOUNTER — OFFICE VISIT (OUTPATIENT)
Dept: PHYSICAL THERAPY | Facility: CLINIC | Age: 58
End: 2023-11-29
Payer: COMMERCIAL

## 2023-11-29 DIAGNOSIS — M54.2 NECK PAIN: ICD-10-CM

## 2023-11-29 DIAGNOSIS — Z47.89 AFTERCARE FOLLOWING SURGERY OF THE MUSCULOSKELETAL SYSTEM: Primary | ICD-10-CM

## 2023-11-29 DIAGNOSIS — M25.511 RIGHT SHOULDER PAIN, UNSPECIFIED CHRONICITY: ICD-10-CM

## 2023-11-29 PROCEDURE — 97140 MANUAL THERAPY 1/> REGIONS: CPT

## 2023-11-29 PROCEDURE — 97112 NEUROMUSCULAR REEDUCATION: CPT

## 2023-11-29 PROCEDURE — 97110 THERAPEUTIC EXERCISES: CPT

## 2023-11-29 NOTE — PROGRESS NOTES
Daily Note    Today's date: 2023  Patient name: Amanda Lopez  : 1965  MRN: [id number]  Referring provider: Shannon Seal  Dx:   Encounter Diagnosis     ICD-10-CM    1. Aftercare following surgery of the musculoskeletal system  Z47.89       2. Neck pain  M54.2       3. Right shoulder pain, unspecified chronicity  M25.511               Start Time: 0830  Stop Time: 920  Total time in clinic (min): 50 minutes    Subjective: Patient reports "I have a spinning headache today". Reports she gets migraines every 3-4 days. Reports she has also noticed increased neck pain and stiffness today possibly due to the weather. Reports continued R shoulder pain but noticed she is able to lift her arm up more. Objective: See treatment diary below        Assessment: Tolerated treatment well. Patient would benefit from continued PT. Improving R shoulder flexion and ER AAROM despite continued high pain levels. Most limited and painful with R shoulder abd and ER AROM. R shoulder PROM limited due to pain and stiffness. Re-initiated R shoulder isometrics to further progress R shoulder function. Addressed restrictions in her R>L cervical region with most tightness noted at her scalenes and suboccipitals. Plan: Progress treatment as tolerated. Precautions: s/p C4-C5, C5-C6 ACDF on 23 with history of cervical myelopathy with myelomalacia. Precautions no lifting more than gallon of milk.  Prefers to lie SEMIRECUMBENT    Manuals 11/3 11/8 11/10 11/13 11/27 11/29   Cervical mx work [address], [address] [place] [address], [address] [place] [address], [address] [place] [address], [address] [place] [address], [address] [place] [address], [address] [place]   R shoulder PRom [address], [address] [place] [address], [address] [place] [address], [address] [place] [address], [address] [place] and median nerve flossing [address], [address] [place] and median nerve flossing [address], [address] [place]   R shoulder mx work [address], [address] [place] [address], [address] [place] [address], 3 [facility] [address], [address], [address] [place]    scap mobs         Semi recumbent cerv dist/retraction         2nd rib mobs         Thoracic mobs         Neuro Re-Ed         Chin tucks 2x10 3" holds w/ retraction  2x10 3" holds w/ retraction  2x10 3" holds w/ retraction  2x10 3" holds w/ retraction  2x10 3" holds w/ retraction  2x10 5" holds w/ retraction    scap 4 2x10 blue rows 2x10 blue rows 2x10 blue rows 2x10 blue rows 2x10 blue rows 2x10 blue rows   S/l mid/low trap PNF                                             Ther Ex         UBE or treadmill 5 min TM 5 min TM 5 min TM 5 min TM 5 min TM 5 min TM   Pulley   10"x10 10"x10 10"x12 10"x12   scap retraction         Wall slides 15x table 15x table 15x table 15x table 15x table 15x table   Self first rib mob         Shoulder isometrics         R shoulder ER AAROM cane     nv 2x10   Standing shoulder ER/IR         Seated thoracic extension         Doorway pec stretch 3x20" holds    3x20" holds    UT ed 2x10 9 lbs 2x10 9 lbs 2x10 9 lbs 2x10 9 lbs 2x10 9 lbs 2x10 9 lbs   Repeated R sidebend         Supine L cerv SB w/ scap dep/retr         Bicep curls     1lb 2x10 1lb 2x10   R scalene stretch  3x10" 3x10" 3x10"     sidelying ER 2x10 2x10 2x10 2x10 2x10    Ther Activity                           Gait Training                           Modalities

## 2023-12-04 ENCOUNTER — APPOINTMENT (OUTPATIENT)
Dept: PHYSICAL THERAPY | Facility: CLINIC | Age: 58
End: 2023-12-04
Payer: COMMERCIAL

## 2023-12-06 ENCOUNTER — OFFICE VISIT (OUTPATIENT)
Dept: PHYSICAL THERAPY | Facility: CLINIC | Age: 58
End: 2023-12-06
Payer: COMMERCIAL

## 2023-12-06 DIAGNOSIS — M25.511 RIGHT SHOULDER PAIN, UNSPECIFIED CHRONICITY: ICD-10-CM

## 2023-12-06 DIAGNOSIS — Z47.89 AFTERCARE FOLLOWING SURGERY OF THE MUSCULOSKELETAL SYSTEM: Primary | ICD-10-CM

## 2023-12-06 DIAGNOSIS — M54.2 NECK PAIN: ICD-10-CM

## 2023-12-06 PROCEDURE — 97140 MANUAL THERAPY 1/> REGIONS: CPT

## 2023-12-06 PROCEDURE — 97112 NEUROMUSCULAR REEDUCATION: CPT

## 2023-12-06 PROCEDURE — 97110 THERAPEUTIC EXERCISES: CPT

## 2023-12-06 NOTE — PROGRESS NOTES
Daily Note    Today's date: 2023  Patient name: Marlo Domingo  : 1965  MRN: [id number]  Referring provider: Honey Skelton  Dx:   Encounter Diagnosis     ICD-10-CM    1. Aftercare following surgery of the musculoskeletal system  Z47.89       2. Neck pain  M54.2       3. Right shoulder pain, unspecified chronicity  M25.511                 Start Time: 0830  Stop Time: 0915  Total time in clinic (min): 45 minutes    Subjective: Patient reports she has a headache again today. Reports increased soreness that lasted 2 days following last session in her R shoulder. Reports slight improvements in her neck muscle tightness. Objective: See treatment diary below        Assessment: Tolerated treatment well. Patient would benefit from continued PT. Focused session on improving R shoulder AAROM and muscle activation in pain-free ranges with good tolerance noted. Continued tightness noted in her R cervical musculature with improvements noted following manuals. Plan: Progress treatment as tolerated. Precautions: s/p C4-C5, C5-C6 ACDF on 23 with history of cervical myelopathy with myelomalacia. Precautions no lifting more than gallon of milk.  Prefers to lie SEMIRECUMBENT    Manuals 11/3 11/8 11/10 11/13 11/27 11/29 12/6   Cervical mx work [address], [address] [place] [address], [address] [place] [address], [address] [place] [address], [address] [place] [address], [address] [place] [address], [address] [place] [address], [address] [place]   R shoulder PRom [address], [address] [place] [address], [address] [place] [address], [address] [place] [address], [address] [place] and median nerve flossing [address], [address] [place] and median nerve flossing [address], [address] [place] [address], [address] [place]   R shoulder mx work [address], [address] [place] [address], [address] [place] [address], [address] [place] [address], [address] [place] [address], [address] [place]     scap mobs          Semi recumbent cerv dist/retraction       [address], [address] [place]   2nd rib mobs          Thoracic mobs          Neuro Re-Ed          Chin tucks 2x10 3" holds w/ retraction  2x10 3" holds w/ retraction  2x10 3" holds w/ retraction  2x10 3" holds w/ retraction  2x10 3" holds w/ retraction  2x10 5" holds w/ retraction  2x10 5" holds w/ retraction    scap 4 2x10 blue rows 2x10 blue rows 2x10 blue rows 2x10 blue rows 2x10 blue rows 2x10 blue rows 2x10 blue rows   S/l mid/low trap PNF Ther Ex          UBE or treadmill 5 min TM 5 min TM 5 min TM 5 min TM 5 min TM 5 min TM 5 min TM   P[place]ley   10"x10 10"x10 10"x12 10"x12    scap retraction          Wall slides 15x table 15x table 15x table 15x table 15x table 15x table 15x table with FR   Self first rib mob          Shoulder isometrics       FLX, ER, IR 5"x6   R shoulder ER AAROM cane     nv 2x10 5"x10   Standing shoulder ER/IR          Seated thoracic extension          Doorway pec stretch 3x20" holds    3x20" holds     UT ed 2x10 9 lbs 2x10 9 lbs 2x10 9 lbs 2x10 9 lbs 2x10 9 lbs 2x10 9 lbs dc   Repeated R sidebend          Supine L cerv SB w/ scap dep/retr          Bicep curls     1lb 2x10 1lb 2x10 nv   R scalene stretch  3x10" 3x10" 3x10"      sidelying ER 2x10 2x10 2x10 2x10 2x10     Ther Activity                              Gait Training                              Modalities

## 2023-12-11 ENCOUNTER — APPOINTMENT (OUTPATIENT)
Dept: PHYSICAL THERAPY | Facility: CLINIC | Age: 58
End: 2023-12-11
Payer: COMMERCIAL

## 2023-12-11 DIAGNOSIS — Z98.1 S/P CERVICAL SPINAL FUSION: Primary | ICD-10-CM

## 2023-12-11 DIAGNOSIS — G95.89 MYELOMALACIA (CMS/HCC): ICD-10-CM

## 2023-12-13 ENCOUNTER — OFFICE VISIT (OUTPATIENT)
Dept: PHYSICAL THERAPY | Facility: CLINIC | Age: 58
End: 2023-12-13
Payer: COMMERCIAL

## 2023-12-13 DIAGNOSIS — M25.511 RIGHT SHOULDER PAIN, UNSPECIFIED CHRONICITY: ICD-10-CM

## 2023-12-13 DIAGNOSIS — M54.2 NECK PAIN: ICD-10-CM

## 2023-12-13 DIAGNOSIS — Z47.89 AFTERCARE FOLLOWING SURGERY OF THE MUSCULOSKELETAL SYSTEM: Primary | ICD-10-CM

## 2023-12-13 PROCEDURE — 97112 NEUROMUSCULAR REEDUCATION: CPT

## 2023-12-13 PROCEDURE — 97110 THERAPEUTIC EXERCISES: CPT

## 2023-12-13 PROCEDURE — 97140 MANUAL THERAPY 1/> REGIONS: CPT

## 2023-12-13 NOTE — PROGRESS NOTES
Daily Note    Today's date: 2023  Patient name: Abilio Mars  : 1965  MRN: [id number]  Referring provider: Sadiq Abernathy  Dx:   Encounter Diagnosis     ICD-10-CM    1. Aftercare following surgery of the musculoskeletal system  Z47.89       2. Neck pain  M54.2       3. Right shoulder pain, unspecified chronicity  M25.511                   Start Time: 0830  Stop Time: 0915  Total time in clinic (min): 45 minutes    Subjective: Patient reports her R shoulder is feeling a little better but she continues to have RUE radicular pain and neck tightness. Objective: See treatment diary below        Assessment: Tolerated treatment well. Patient would benefit from continued PT. Improving R shoulder PROM noted but continues to be limited in [facility] and remains challenged with isometrics. Continued tightness in her R cervical musculature with improvements noted following STM. Plan: Progress treatment as tolerated. Precautions: s/p C4-C5, C5-C6 ACDF on 23 with history of cervical myelopathy with myelomalacia. Precautions no lifting more than gallon of milk.  Prefers to lie SEMIRECUMBENT    Manuals 11/3 11/8 11/10 11/13 11/27 11/29 12/6 12/13   Cervical mx work [address], [address] [place] [address], [address] [place] [address], [address] [place] [address], [address] [place] [address], [address] [place] [address], [address] [place] [address], [address] [place] [address], [address] [place]   R shoulder PRom [address], [address] [place] [address], [address] [place] [address], [address] [place] [address], [address] [place] and median nerve flossing [address], [address] [place] and median nerve flossing [address], [address] [place] [address], [address] [place] [address], [address] [place]   R shoulder mx work [address], [address] [place] [address], [address] [place] [address], [address] [place] [address], [address] [place] [address], [address] [place]      scap mobs           Semi recumbent cerv dist/retraction       [address], [address] [place] [address], [address] [place]   2nd rib mobs           Thoracic mobs           Neuro Re-Ed           Chin tucks 2x10 3" holds w/ retraction  2x10 3" holds w/ retraction  2x10 3" holds w/ retraction  2x10 3" holds w/ retraction  2x10 3" holds w/ retraction  2x10 5" holds w/ retraction  2x10 5" holds w/ retraction  2x10 5" holds w/ retraction    scap 4 2x10 blue rows 2x10 blue rows 2x10 blue rows 2x10 blue rows 2x10 blue rows 2x10 blue rows 2x10 blue rows 2x10 blue rows   S/l mid/low trap PNF                                                       Ther Ex           UBE or treadmill 5 min TM 5 min TM 5 min TM 5 min TM 5 min TM 5 min TM 5 min TM 5 min TM   P[place]ley   10"x10 10"x10 10"x12 10"x12     scap retraction           Wall slides 15x table 15x table 15x table 15x table 15x table 15x table 15x table with FR 15x table with FR   Self first rib mob           Shoulder isometrics       FLX, ER, IR 5"x6 FLX, ABD, ER, IR 5"x6   R shoulder ER AAROM cane     nv 2x10 5"x10 5"x10   Standing shoulder ER/IR           Seated thoracic extension           Doorway pec stretch 3x20" holds    3x20" holds      UT ed 2x10 9 lbs 2x10 9 lbs 2x10 9 lbs 2x10 9 lbs 2x10 9 lbs 2x10 9 lbs dc    Repeated R sidebend           Supine L cerv SB w/ scap dep/retr           Bicep curls     1lb 2x10 1lb 2x10 nv 1lb 2x10   R scalene stretch  3x10" 3x10" 3x10"       sidelying ER 2x10 2x10 2x10 2x10 2x10      Ther Activity                                 Gait Training                                 Modalities

## 2023-12-18 DIAGNOSIS — M54.12 CERVICAL RADICULOPATHY: ICD-10-CM

## 2023-12-18 DIAGNOSIS — M75.41 IMPINGEMENT SYNDROME OF SHOULDER, RIGHT: ICD-10-CM

## 2023-12-18 DIAGNOSIS — Z98.1 S/P CERVICAL SPINAL FUSION: Primary | ICD-10-CM

## 2023-12-20 ENCOUNTER — OFFICE VISIT (OUTPATIENT)
Dept: PHYSICAL THERAPY | Facility: CLINIC | Age: 58
End: 2023-12-20
Payer: COMMERCIAL

## 2023-12-20 DIAGNOSIS — M25.511 RIGHT SHOULDER PAIN, UNSPECIFIED CHRONICITY: ICD-10-CM

## 2023-12-20 DIAGNOSIS — M54.2 NECK PAIN: ICD-10-CM

## 2023-12-20 DIAGNOSIS — Z47.89 AFTERCARE FOLLOWING SURGERY OF THE MUSCULOSKELETAL SYSTEM: Primary | ICD-10-CM

## 2023-12-20 PROCEDURE — 97140 MANUAL THERAPY 1/> REGIONS: CPT

## 2023-12-20 PROCEDURE — 97110 THERAPEUTIC EXERCISES: CPT

## 2023-12-20 PROCEDURE — 97112 NEUROMUSCULAR REEDUCATION: CPT

## 2023-12-20 NOTE — PROGRESS NOTES
"Daily Note    Today's date: 2023  Patient name: Marcella Eller  : 1965  MRN: [id number]  Referring provider: Adam Eng  Dx:   Encounter Diagnosis     ICD-10-CM    1. Aftercare following surgery of the musculoskeletal system  Z47.89       2. Neck pain  M54.2       3. Right shoulder pain, unspecified chronicity  M25.511                     Start Time: 0830  Stop Time: 0915  Total time in clinic (min): 45 minutes    Subjective: Patient reports she has a lot of stiffness today in her neck and shoulder. Reports she also has been having more L arm pain when eating and leaning forward. Reports she can move her arm more when it's close to her body but is unable to reach out away from her body without sharp pain.    Objective: See treatment diary below        Assessment: Tolerated treatment fair. Patient would benefit from continued PT. Improved muscle flexibility noted following STM. Continued difficulty with R shoulder ER and abduction PROM, AAROM, and isometrics. Muscle fatigue noted following isometrics and bicep curls.    Plan: Progress treatment as tolerated.       Precautions: s/p C4-C5, C5-C6 ACDF on 23 with history of cervical myelopathy with myelomalacia. Precautions no lifting more than gallon of milk. Prefers to lie SEMIRECUMBENT    Manuals 11/3 11/8 11/10 11/13 11/27 11/29 12/6 12/13 12/20   Cervical mx work [facility] MK MK MK MK MK MK MK   R shoulder PRom MK MK MK MK and median nerve flossing MK and median nerve flossing MK MK MK MK   R shoulder mx work MK MK MK MK MK       scap mobs            Semi recumbent cerv dist/retraction       MK MK MK   2nd rib mobs            Thoracic mobs            Neuro Re-Ed            Chin tucks 2x10 3\" holds w/ retraction  2x10 3\" holds w/ retraction  2x10 3\" holds w/ retraction  2x10 3\" holds w/ retraction  2x10 3\" holds w/ retraction  2x10 5\" holds w/ retraction  2x10 5\" holds w/ retraction  2x10 5\" holds w/ retraction  2x10 5\" holds w/ retr   scap 4 2x10 " "blue rows 2x10 blue rows 2x10 blue rows 2x10 blue rows 2x10 blue rows 2x10 blue rows 2x10 blue rows 2x10 blue rows 2x10 blue rows   S/l mid/low trap PNF                                                            Ther Ex            UBE or treadmill 5 min TM 5 min TM 5 min TM 5 min TM 5 min TM 5 min TM 5 min TM 5 min TM 5 min TM   Pulley   10\"x10 10\"x10 10\"x12 10\"x12      scap retraction            Wall slides 15x table 15x table 15x table 15x table 15x table 15x table 15x table with FR 15x table with FR 15x table with FR   Self first rib mob            Shoulder isometrics       FLX, ER, IR 5\"x6 FLX, ABD, ER, IR 5\"x6 FLX, ABD, ER, IR 5\"x6   R shoulder ER AAROM cane     nv 2x10 5\"x10 5\"x10 5\"x10   Standing shoulder ER/IR            Seated thoracic extension            Doorway pec stretch 3x20\" holds    3x20\" holds       UT ed 2x10 9 lbs 2x10 9 lbs 2x10 9 lbs 2x10 9 lbs 2x10 9 lbs 2x10 9 lbs dc     Repeated R sidebend            Supine L cerv SB w/ scap dep/retr            Bicep curls     1lb 2x10 1lb 2x10 nv 1lb 2x10 1lb 2x10   R scalene stretch  3x10\" 3x10\" 3x10\"     5x10\"   sidelying ER 2x10 2x10 2x10 2x10 2x10       Ther Activity                                    Gait Training                                    Modalities                                         "

## 2023-12-27 ENCOUNTER — OFFICE VISIT (OUTPATIENT)
Dept: PHYSICAL THERAPY | Facility: CLINIC | Age: 58
End: 2023-12-27
Payer: COMMERCIAL

## 2023-12-27 DIAGNOSIS — Z47.89 AFTERCARE FOLLOWING SURGERY OF THE MUSCULOSKELETAL SYSTEM: Primary | ICD-10-CM

## 2023-12-27 DIAGNOSIS — M54.2 NECK PAIN: ICD-10-CM

## 2023-12-27 DIAGNOSIS — M25.511 RIGHT SHOULDER PAIN, UNSPECIFIED CHRONICITY: ICD-10-CM

## 2023-12-27 PROCEDURE — 97110 THERAPEUTIC EXERCISES: CPT

## 2023-12-27 PROCEDURE — 97140 MANUAL THERAPY 1/> REGIONS: CPT

## 2023-12-27 PROCEDURE — 97112 NEUROMUSCULAR REEDUCATION: CPT

## 2023-12-27 NOTE — LETTER
2023    Adam Eng  [address] [facility] [address]    Patient: Marcella Eller   YOB: 1965   Date of Visit: 2023     Encounter Diagnosis     ICD-10-CM    1. Aftercare following surgery of the musculoskeletal system  Z47.89       2. Neck pain  M54.2       3. Right shoulder pain, unspecified chronicity  M25.511           Dear Dr. Eng:    Thank you for your recent referral of Marcella Eller. Please review the attached evaluation summary from Marcella's recent visit.     Please verify that you agree with the plan of care by signing the attached order.     If you have any questions or concerns, please do not hesitate to call.     I sincerely appreciate the opportunity to share in the care of one of your patients and hope to have another opportunity to work with you in the near future.       Sincerely,    Helen Espinoza, PT      Referring Provider:      I certify that I have read the below Plan of Care and certify the need for these services furnished under this plan of treatment while under my care.                    Adam Eng  [address] [facility] [address]  Via Fax: [phone]          PT Re-evaluation    Today's date: 2023  Patient name: Marcella Eller  : 1965  MRN: [id number]  Referring provider: Adam Eng  Dx:   Encounter Diagnosis     ICD-10-CM    1. Aftercare following surgery of the musculoskeletal system  Z47.89       2. Neck pain  M54.2       3. Right shoulder pain, unspecified chronicity  M25.511               Start Time: 0830  Stop Time: 915  Total time in clinic (min): 45 minutes    Subjective: Patient reports she has made some improvements in her neck mobility and muscle tightness since beginning PT however she continues to have significant, constant neck pain, RUE radicular pain, and R shoulder pain. Reports she also has continued R sided neck muscle tightness as well that worsens her radicular symptoms. Reports she  has also been getting LUE radicular symptoms that she notices occurs when sitting with forward head posture. Reports she has also been having increased R shoulder pain since her more recent flare up of R shoulder impingement. Reports continued difficulty with driving, reaching, lifting, and performing ADLs due to her symptoms. Reports she would like to continue PT to maximize her functional mobility overall. Reports she has a pain management appointment in February.    Objective: See treatment diary below    Passive Range of Motion  R shoulder PROM:  Flexion: 170 deg with pain  Abduction: 100 deg with pain  ER: 50 deg with pain  IR: 50 deg     Active Range of Motion   Cervical/Thoracic Spine         Cervical     Flexion:  Restriction level: moderate  Extension:  with pain Restriction level: maximal  Left rotation:  Restriction level: minimal  Right rotation:  with pain Restriction level: moderate  Left side bending:   Restriction level: minimal  Right side bending:  with pain Restriction level: moderate     Slow, guarded movements in all directions    Left Shoulder   Flexion: WFL  Abduction: WFL    R shoulder AROM:  Flexion: 65 deg with pain and weakness  Abduction: 35 deg pain and weakness  ER: 45 deg with pain  IR: L4     Strength/Myotome Testing   Cervical Spine      Left   Interossei strength (t1): 4     Right   Interossei strength (t1): 3+    Left Shoulder      Isolated Muscles   Lower trapezius: 3+   Middle trapezius: 4-   Serratus anterior: 4-     Right Shoulder     Flexion: 3-/5  Abduction: 2+/5  ER: 3/5  IR: 3+/5    Isolated Muscles   Lower trapezius: 3+   Middle trapezius: 3+   Serratus anterior: 3+      Left Elbow   Flexion: 4  Extension: 4     Right Elbow   Flexion: 3+  Extension: 3+     Left Wrist/Hand   Wrist flexion: 4  Thumb extension: 4     Right Wrist/Hand   Wrist extension: 3+  Wrist flexion: 3+  Thumb extension: 3+     General Comments:   L shoulder AROM/strength: WFL all directions no pain      Anterior R shoulder pain/pinching with ER and abduction PROM and flexion/abduction AROM     Palpation: TTP R UT, cervical paraspinals, scalenes, supraspinatus, infraspinatus with referral down RUE     Cervical/Thoracic Comments  Hypertonic scalenes  Hypertonicity/adhesions noted in anterior cervical region  Neuro Exam:      Headaches   Patient reports headaches: Yes - chronic migraines.      Goals  STG  1. Patient will be independent with completion of HEP throughout therapy - goal met  2. Patient will have at worst 5/10 pain so patient can sleep with less discomfort in 3 weeks.  LTG - in progress  1. Patient will increase R cervical rotation and extension to at least moderate restriction in 4 weeks.- in progress  2. Patient will increase R shoulder strength at least 1/2 grade so patient can use her R UE for functional tasks with less difficulty in 6 weeks. - in progress        Assessment: Tolerated treatment fair. Patient would benefit from continued PT.   Patient presents to PT for a progress note. Subjectively, patient reports she has noticed some improvements in her neck mobility and muscle flexibility since beginning PT. Objectively, patient has made improvements in cervical AROM, B scapular stability, R shoulder ROM, and RUE strength. Despite these improvements, patient continues to demonstrate deficits in cervical mobility, anterior cervical muscle flexibility, R shoulder AROM, and R shoulder strength. Patient would benefit from continued PT to address above impairments and achieve remaining therapy goals.      Plan: Progress treatment as tolerated.  Plan to continue PT 1x/week for 10 weeks.     Precautions: s/p C4-C5, C5-C6 ACDF on 7/26/23 with history of cervical myelopathy with myelomalacia. Precautions no lifting more than gallon of milk. Prefers to lie SEMIRECUMBENT    Manuals 11/3 11/8 11/10 11/13 11/27 11/29 12/6 12/13 12/20 12/27   Cervical mx work MK MK MK MK MK MK MK MK MK MK   R shoulder PRom MK  "MK MK MK and median nerve flossing MK and median nerve flossing MK MK MK MK MK   R shoulder mx work MK MK MK MK MK        scap mobs             Semi recumbent cerv dist/retraction       MK MK MK MK   2nd rib mobs             Thoracic mobs             Neuro Re-Ed             Chin tucks 2x10 3\" holds w/ retraction  2x10 3\" holds w/ retraction  2x10 3\" holds w/ retraction  2x10 3\" holds w/ retraction  2x10 3\" holds w/ retraction  2x10 5\" holds w/ retraction  2x10 5\" holds w/ retraction  2x10 5\" holds w/ retraction  2x10 5\" holds w/ retr 2x10 5\" holds w/ retraction    scap 4 2x10 blue rows 2x10 blue rows 2x10 blue rows 2x10 blue rows 2x10 blue rows 2x10 blue rows 2x10 blue rows 2x10 blue rows 2x10 blue rows 2x10 blue rows   S/l mid/low trap PNF                                                                 Ther Ex             UBE or treadmill 5 min TM 5 min TM 5 min TM 5 min TM 5 min TM 5 min TM 5 min TM 5 min TM 5 min TM 5 min TM   Pulley   10\"x10 10\"x10 10\"x12 10\"x12       R UT stretch          5x10\"   Wall slides 15x table 15x table 15x table 15x table 15x table 15x table 15x table with FR 15x table with FR 15x table with FR 15x table with FR   Self first rib mob             Shoulder isometrics       FLX, ER, IR 5\"x6 FLX, ABD, ER, IR 5\"x6 FLX, ABD, ER, IR 5\"x6 FLX, ABD, ER, IR 5\"x6   R shoulder ER AAROM cane     nv 2x10 5\"x10 5\"x10 5\"x10 5\"x15   Standing shoulder ER/IR             Seated thoracic extension             Doorway pec stretch 3x20\" holds    3x20\" holds        UT ed 2x10 9 lbs 2x10 9 lbs 2x10 9 lbs 2x10 9 lbs 2x10 9 lbs 2x10 9 lbs dc      Repeated R sidebend             Supine L cerv SB w/ scap dep/retr             Bicep curls     1lb 2x10 1lb 2x10 nv 1lb 2x10 1lb 2x10 1lb 2x10   R scalene stretch  3x10\" 3x10\" 3x10\"     5x10\"    sidelying ER 2x10 2x10 2x10 2x10 2x10        Ther Activity                                       Gait Training                                       Modalities                 "

## 2023-12-27 NOTE — PROGRESS NOTES
PT Re-evaluation    Today's date: 2023  Patient name: Marcella Eller  : 1965  MRN: [id number]  Referring provider: Adam Eng  Dx:   Encounter Diagnosis     ICD-10-CM    1. Aftercare following surgery of the musculoskeletal system  Z47.89       2. Neck pain  M54.2       3. Right shoulder pain, unspecified chronicity  M25.511               Start Time: 0830  Stop Time: 0915  Total time in clinic (min): 45 minutes    Subjective: Patient reports she has made some improvements in her neck mobility and muscle tightness since beginning PT however she continues to have significant, constant neck pain, RUE radicular pain, and R shoulder pain. Reports she also has continued R sided neck muscle tightness as well that worsens her radicular symptoms. Reports she has also been getting LUE radicular symptoms that she notices occurs when sitting with forward head posture. Reports she has also been having increased R shoulder pain since her more recent flare up of R shoulder impingement. Reports continued difficulty with driving, reaching, lifting, and performing ADLs due to her symptoms. Reports she would like to continue PT to maximize her functional mobility overall. Reports she has a pain management appointment in February.    Objective: See treatment diary below    Passive Range of Motion  R shoulder PROM:  Flexion: 170 deg with pain  Abduction: 100 deg with pain  ER: 50 deg with pain  IR: 50 deg     Active Range of Motion   Cervical/Thoracic Spine         Cervical     Flexion:  Restriction level: moderate  Extension:  with pain Restriction level: maximal  Left rotation:  Restriction level: minimal  Right rotation:  with pain Restriction level: moderate  Left side bending:   Restriction level: minimal  Right side bending:  with pain Restriction level: moderate     Slow, guarded movements in all directions    Left Shoulder   Flexion: WFL  Abduction: WFL    R shoulder AROM:  Flexion: 65 deg with pain and  weakness  Abduction: 35 deg pain and weakness  ER: 45 deg with pain  IR: L4     Strength/Myotome Testing   Cervical Spine      Left   Interossei strength (t1): 4     Right   Interossei strength (t1): 3+    Left Shoulder      Isolated Muscles   Lower trapezius: 3+   Middle trapezius: 4-   Serratus anterior: 4-     Right Shoulder     Flexion: 3-/5  Abduction: 2+/5  ER: 3/5  IR: 3+/5    Isolated Muscles   Lower trapezius: 3+   Middle trapezius: 3+   Serratus anterior: 3+      Left Elbow   Flexion: 4  Extension: 4     Right Elbow   Flexion: 3+  Extension: 3+     Left Wrist/Hand   Wrist flexion: 4  Thumb extension: 4     Right Wrist/Hand   Wrist extension: 3+  Wrist flexion: 3+  Thumb extension: 3+     General Comments:   L shoulder AROM/strength: WFL all directions no pain     Anterior R shoulder pain/pinching with ER and abduction PROM and flexion/abduction AROM     Palpation: TTP R UT, cervical paraspinals, scalenes, supraspinatus, infraspinatus with referral down RUE     Cervical/Thoracic Comments  Hypertonic scalenes  Hypertonicity/adhesions noted in anterior cervical region  Neuro Exam:      Headaches   Patient reports headaches: Yes - chronic migraines.      Goals  STG  1. Patient will be independent with completion of HEP throughout therapy - goal met  2. Patient will have at worst 5/10 pain so patient can sleep with less discomfort in 3 weeks.  LTG - in progress  1. Patient will increase R cervical rotation and extension to at least moderate restriction in 4 weeks.- in progress  2. Patient will increase R shoulder strength at least 1/2 grade so patient can use her R UE for functional tasks with less difficulty in 6 weeks. - in progress        Assessment: Tolerated treatment fair. Patient would benefit from continued PT.   Patient presents to PT for a progress note. Subjectively, patient reports she has noticed some improvements in her neck mobility and muscle flexibility since beginning PT. Objectively, patient  "has made improvements in cervical AROM, B scapular stability, R shoulder ROM, and RUE strength. Despite these improvements, patient continues to demonstrate deficits in cervical mobility, anterior cervical muscle flexibility, R shoulder AROM, and R shoulder strength. Patient would benefit from continued PT to address above impairments and achieve remaining therapy goals.      Plan: Progress treatment as tolerated.  Plan to continue PT 1x/week for 10 weeks.     Precautions: s/p C4-C5, C5-C6 ACDF on 7/26/23 with history of cervical myelopathy with myelomalacia. Precautions no lifting more than gallon of milk. Prefers to lie SEMIRECUMBENT    Manuals 11/3 11/8 11/10 11/13 11/27 11/29 12/6 12/13 12/20 12/27   Cervical mx work MK MK MK MK MK MK MK MK MK MK   R shoulder PRom MK MK MK MK and median nerve flossing MK and median nerve flossing MK MK MK MK MK   R shoulder mx work MK MK MK MK MK        scap mobs             Semi recumbent cerv dist/retraction       MK MK MK MK   2nd rib mobs             Thoracic mobs             Neuro Re-Ed             Chin tucks 2x10 3\" holds w/ retraction  2x10 3\" holds w/ retraction  2x10 3\" holds w/ retraction  2x10 3\" holds w/ retraction  2x10 3\" holds w/ retraction  2x10 5\" holds w/ retraction  2x10 5\" holds w/ retraction  2x10 5\" holds w/ retraction  2x10 5\" holds w/ retr 2x10 5\" holds w/ retraction    scap 4 2x10 blue rows 2x10 blue rows 2x10 blue rows 2x10 blue rows 2x10 blue rows 2x10 blue rows 2x10 blue rows 2x10 blue rows 2x10 blue rows 2x10 blue rows   S/l mid/low trap PNF                                                                 Ther Ex             UBE or treadmill 5 min TM 5 min TM 5 min TM 5 min TM 5 min TM 5 min TM 5 min TM 5 min TM 5 min TM 5 min TM   Pulley   10\"x10 10\"x10 10\"x12 10\"x12       R UT stretch          5x10\"   Wall slides 15x table 15x table 15x table 15x table 15x table 15x table 15x table with FR 15x table with FR 15x table with FR 15x table with FR   Self " "first rib mob             Shoulder isometrics       FLX, ER, IR 5\"x6 FLX, ABD, ER, IR 5\"x6 FLX, ABD, ER, IR 5\"x6 FLX, ABD, ER, IR 5\"x6   R shoulder ER AAROM cane     nv 2x10 5\"x10 5\"x10 5\"x10 5\"x15   Standing shoulder ER/IR             Seated thoracic extension             Doorway pec stretch 3x20\" holds    3x20\" holds        UT ed 2x10 9 lbs 2x10 9 lbs 2x10 9 lbs 2x10 9 lbs 2x10 9 lbs 2x10 9 lbs dc      Repeated R sidebend             Supine L cerv SB w/ scap dep/retr             Bicep curls     1lb 2x10 1lb 2x10 nv 1lb 2x10 1lb 2x10 1lb 2x10   R scalene stretch  3x10\" 3x10\" 3x10\"     5x10\"    sidelying ER 2x10 2x10 2x10 2x10 2x10        Ther Activity                                       Gait Training                                       Modalities                                            "

## 2024-01-11 ENCOUNTER — OFFICE VISIT (OUTPATIENT)
Dept: PHYSICAL THERAPY | Facility: CLINIC | Age: 59
End: 2024-01-11
Payer: COMMERCIAL

## 2024-01-11 DIAGNOSIS — Z47.89 AFTERCARE FOLLOWING SURGERY OF THE MUSCULOSKELETAL SYSTEM: Primary | ICD-10-CM

## 2024-01-11 DIAGNOSIS — M54.2 NECK PAIN: ICD-10-CM

## 2024-01-11 DIAGNOSIS — M25.511 RIGHT SHOULDER PAIN, UNSPECIFIED CHRONICITY: ICD-10-CM

## 2024-01-11 PROCEDURE — 97112 NEUROMUSCULAR REEDUCATION: CPT | Performed by: PHYSICAL THERAPIST

## 2024-01-11 PROCEDURE — 97140 MANUAL THERAPY 1/> REGIONS: CPT | Performed by: PHYSICAL THERAPIST

## 2024-01-11 PROCEDURE — 97110 THERAPEUTIC EXERCISES: CPT | Performed by: PHYSICAL THERAPIST

## 2024-01-11 NOTE — PROGRESS NOTES
"PT Re-evaluation    Today's date: 2024  Patient name: Marcella Eller  : 1965  MRN: [id number]  Referring provider: Adam Eng  Dx:   Encounter Diagnosis     ICD-10-CM    1. Aftercare following surgery of the musculoskeletal system  Z47.89       2. Neck pain  M54.2       3. Right shoulder pain, unspecified chronicity  M25.511                          Subjective: Patient feeling more functional but in more pain today and has headache and is dizzy today.    Objective: See treatment diary below        Assessment: Tolerated treatment fair. Patient would benefit from continued PT.   Patient would benefit from continued PT to address above impairments and achieve remaining therapy goals.      Plan: Progress treatment as tolerated.       Precautions: s/p C4-C5, C5-C6 ACDF on 23 with history of cervical myelopathy with myelomalacia. Precautions no lifting more than gallon of milk. Prefers to lie SEMIRECUMBENT    Manuals    Cervical mx work MK MK MK MK MK MK MK MK   R shoulder PRom MK and median nerve flossing MK and median nerve flossing MK MK MK MK MK MK   R shoulder mx work MK MK         scap mobs           Semi recumbent cerv dist/retraction    MK MK MK MK MK   2nd rib mobs           Thoracic mobs           Neuro Re-Ed           Chin tucks 2x10 3\" holds w/ retraction  2x10 3\" holds w/ retraction  2x10 5\" holds w/ retraction  2x10 5\" holds w/ retraction  2x10 5\" holds w/ retraction  2x10 5\" holds w/ retr 2x10 5\" holds w/ retraction  2x10 5\" holds w/ retraction   scap 4 2x10 blue rows 2x10 blue rows 2x10 blue rows 2x10 blue rows 2x10 blue rows 2x10 blue rows 2x10 blue rows 220x 5\" holds   S/l mid/low trap PNF                                                       Ther Ex           UBE or treadmill 5 min TM 5 min TM 5 min TM 5 min TM 5 min TM 5 min TM 5 min TM 5 min TM   Pulley 10\"x10 10\"x12 10\"x12        R UT stretch       5x10\" 5x 10\" holds   Wall slides 15x " "table 15x table 15x table 15x table with FR 15x table with FR 15x table with FR 15x table with FR 15x table w/ foam roll   Self first rib mob           Shoulder isometrics    FLX, ER, IR 5\"x6 FLX, ABD, ER, IR 5\"x6 FLX, ABD, ER, IR 5\"x6 FLX, ABD, ER, IR 5\"x6 6x 5\" holds    R shoulder ER AAROM cane  nv 2x10 5\"x10 5\"x10 5\"x10 5\"x15 15x 5\" holds   Standing shoulder ER/IR           Seated thoracic extension           Doorway pec stretch  3x20\" holds         UT ed 2x10 9 lbs 2x10 9 lbs 2x10 9 lbs dc       Repeated R sidebend           Supine L cerv SB w/ scap dep/retr           Bicep curls  1lb 2x10 1lb 2x10 nv 1lb 2x10 1lb 2x10 1lb 2x10 3x10 1 lb    R scalene stretch 3x10\"     5x10\"     sidelying ER 2x10 2x10         Ther Activity                                 Gait Training                                 Modalities                                      "

## 2024-01-17 ENCOUNTER — APPOINTMENT (OUTPATIENT)
Dept: PHYSICAL THERAPY | Facility: CLINIC | Age: 59
End: 2024-01-17
Payer: COMMERCIAL

## 2024-01-24 ENCOUNTER — OFFICE VISIT (OUTPATIENT)
Dept: PHYSICAL THERAPY | Facility: CLINIC | Age: 59
End: 2024-01-24
Payer: COMMERCIAL

## 2024-01-24 DIAGNOSIS — M25.511 RIGHT SHOULDER PAIN, UNSPECIFIED CHRONICITY: ICD-10-CM

## 2024-01-24 DIAGNOSIS — M54.2 NECK PAIN: ICD-10-CM

## 2024-01-24 DIAGNOSIS — Z47.89 AFTERCARE FOLLOWING SURGERY OF THE MUSCULOSKELETAL SYSTEM: Primary | ICD-10-CM

## 2024-01-24 PROCEDURE — 97110 THERAPEUTIC EXERCISES: CPT

## 2024-01-24 PROCEDURE — 97112 NEUROMUSCULAR REEDUCATION: CPT

## 2024-01-24 PROCEDURE — 97140 MANUAL THERAPY 1/> REGIONS: CPT

## 2024-01-24 NOTE — PROGRESS NOTES
"Daily Note    Today's date: 2024  Patient name: Marcella Eller  : 1965  MRN: [id number]  Referring provider: Adam Eng  Dx:   Encounter Diagnosis     ICD-10-CM    1. Aftercare following surgery of the musculoskeletal system  Z47.89       2. Neck pain  M54.2       3. Right shoulder pain, unspecified chronicity  M25.511                 Start Time: 0915  Stop Time: 1000  Total time in clinic (min): 45 minutes    Subjective: Patient notes her headaches are limiting her the most overall. Reports continued R sided neck, shoulder, and UE radicular pain.    Objective: See treatment diary below        Assessment: Tolerated treatment fair. Patient would benefit from continued PT. Most limitations noted with R shoulder abduction PROM due to pain. Fatigue noted with shoulder isometrics and bicep curls due to weakness. Exercises not progressed due to appropriate challenge and fatigue levels noted with current exercise intensity. Decreased tightness in her R cervical musculature noted following STM with radicular symptoms reproduced. Patient would benefit from continued PT to address above impairments and achieve remaining therapy goals.      Plan: Progress treatment as tolerated.       Precautions: s/p C4-C5, C5-C6 ACDF on 23 with history of cervical myelopathy with myelomalacia. Precautions no lifting more than gallon of milk. Prefers to lie SEMIRECUMBENT    Manuals    Cervical mx work MK MK MK MK MK MK MK MK MK   R shoulder PRom MK and median nerve flossing MK and median nerve flossing MK  MK MK MK MK MK   R shoulder mx work MK MK          scap mobs            Semi recumbent cerv dist/retraction    MK MK MK MK MK MK   2nd rib mobs            Thoracic mobs            Neuro Re-Ed            Chin tucks 2x10 3\" holds w/ retraction  2x10 3\" holds w/ retraction  2x10 5\" holds w/ retraction  2x10 5\" holds w/ retraction  2x10 5\" holds w/ retraction  2x10 5\" " "holds w/ retr 2x10 5\" holds w/ retraction  2x10 5\" holds w/ retraction 2x10 5\" holds w/ retraction   scap 4 2x10 blue rows 2x10 blue rows 2x10 blue rows 2x10 blue rows 2x10 blue rows 2x10 blue rows 2x10 blue rows 220x 5\" holds 2x10 blue rows   S/l mid/low trap PNF                                                            Ther Ex            UBE or treadmill 5 min TM 5 min TM 5 min TM 5 min TM 5 min TM 5 min TM 5 min TM 5 min TM 5 min TM   Pulley 10\"x10 10\"x12 10\"x12         R UT stretch       5x10\" 5x 10\" holds 5x 10\" holds   Wall slides 15x table 15x table 15x table 15x table with FR 15x table with FR 15x table with FR 15x table with FR 15x table w/ foam roll 15x table w/ foam roll   Self first rib mob            Shoulder isometrics    FLX, ER, IR 5\"x6 FLX, ABD, ER, IR 5\"x6 FLX, ABD, ER, IR 5\"x6 FLX, ABD, ER, IR 5\"x6 6x 5\" holds  6x 5\" holds    R shoulder ER AAROM cane  nv 2x10 5\"x10 5\"x10 5\"x10 5\"x15 15x 5\" holds 15x 5\" holds   Standing shoulder ER/IR            Seated thoracic extension            Doorway pec stretch  3x20\" holds          UT ed 2x10 9 lbs 2x10 9 lbs 2x10 9 lbs dc        Repeated R sidebend            Supine L cerv SB w/ scap dep/retr            Bicep curls  1lb 2x10 1lb 2x10 nv 1lb 2x10 1lb 2x10 1lb 2x10 3x10 1 lb  3x10 1 lb   R scalene stretch 3x10\"     5x10\"      sidelying ER 2x10 2x10          Ther Activity                                    Gait Training                                    Modalities                                         "

## 2024-01-31 ENCOUNTER — OFFICE VISIT (OUTPATIENT)
Dept: PHYSICAL THERAPY | Facility: CLINIC | Age: 59
End: 2024-01-31
Payer: COMMERCIAL

## 2024-01-31 DIAGNOSIS — M25.511 RIGHT SHOULDER PAIN, UNSPECIFIED CHRONICITY: ICD-10-CM

## 2024-01-31 DIAGNOSIS — M54.2 NECK PAIN: ICD-10-CM

## 2024-01-31 DIAGNOSIS — Z47.89 AFTERCARE FOLLOWING SURGERY OF THE MUSCULOSKELETAL SYSTEM: Primary | ICD-10-CM

## 2024-01-31 PROCEDURE — 97140 MANUAL THERAPY 1/> REGIONS: CPT

## 2024-01-31 PROCEDURE — 97110 THERAPEUTIC EXERCISES: CPT

## 2024-01-31 PROCEDURE — 97112 NEUROMUSCULAR REEDUCATION: CPT

## 2024-01-31 NOTE — PROGRESS NOTES
"Daily Note    Today's date: 2024  Patient name: Marcella Eller  : 1965  MRN: [id number]  Referring provider: Adam Eng  Dx:   Encounter Diagnosis     ICD-10-CM    1. Aftercare following surgery of the musculoskeletal system  Z47.89       2. Neck pain  M54.2       3. Right shoulder pain, unspecified chronicity  M25.511                 Start Time: 0915  Stop Time: 1000  Total time in clinic (min): 45 minutes    Subjective: Patient notes she feels \"really bad today.\" She thinks the injection wore off and she is feeling more pain today, 8/10. Her neck feels really tense/tight.    Objective: See treatment diary below        Assessment: Tolerated treatment fair.  Modified session to accommodate symptoms; however able to perform the majority of program. Focused on cervical manuals for pain management and PROM and AROM. Patient responded well to STM to cervical regions with relief to radicular symptoms and tightness noted post treatment. R arm still guarded and stiff with greatest limitation in abduction. Patient was fatigued post treatment and would benefit from skilled PT to address pain, remaining deficits and restore PLOF.           Plan: Progress treatment as tolerated.       Precautions: s/p C4-C5, C5-C6 ACDF on 23 with history of cervical myelopathy with myelomalacia. Precautions no lifting more than gallon of milk. Prefers to lie SEMIRECUMBENT    Manuals    Cervical mx work MK  MK [facility] MK MK MK JM   R shoulder PRom MK and median nerve flossing MK and median nerve flossing MK MK MK MK MK MK MK JM   R shoulder mx work MK MK           scap mobs             Semi recumbent cerv dist/retraction    MK MK MK MK MK MK JM   2nd rib mobs             Thoracic mobs             Neuro Re-Ed             Chin tucks 2x10 3\" holds w/ retraction  2x10 3\" holds w/ retraction  2x10 5\" holds w/ retraction  2x10 5\" holds w/ retraction  2x10 5\" holds w/ " "retraction  2x10 5\" holds w/ retr 2x10 5\" holds w/ retraction  2x10 5\" holds w/ retraction 2x10 5\" holds w/ retraction 2x10 5\" holds w/ retraction   scap 4 2x10 blue rows 2x10 blue rows 2x10 blue rows 2x10 blue rows 2x10 blue rows 2x10 blue rows 2x10 blue rows 220x 5\" holds 2x10 blue rows 2x10 blue rows   S/l mid/low trap PNF                                                                 Ther Ex             UBE or treadmill 5 min TM 5 min TM 5 min TM 5 min TM 5 min TM 5 min TM 5 min TM 5 min TM 5 min TM 5 min TM   Pulley 10\"x10 10\"x12 10\"x12          R UT stretch       5x10\" 5x 10\" holds 5x 10\" holds 5x 10\" holds   Wall slides 15x table 15x table 15x table 15x table with FR 15x table with FR 15x table with FR 15x table with FR 15x table w/ foam roll 15x table w/ foam roll 15x table w/ foam roll   Self first rib mob             Shoulder isometrics    FLX, ER, IR 5\"x6 FLX, ABD, ER, IR 5\"x6 FLX, ABD, ER, IR 5\"x6 FLX, ABD, ER, IR 5\"x6 6x 5\" holds  6x 5\" holds  6x 5\" holds   R shoulder ER AAROM cane  nv 2x10 5\"x10 5\"x10 5\"x10 5\"x15 15x 5\" holds 15x 5\" holds 15x 5\" holds   Standing shoulder ER/IR             Seated thoracic extension             Doorway pec stretch  3x20\" holds           UT ed 2x10 9 lbs 2x10 9 lbs 2x10 9 lbs dc         Repeated R sidebend             Supine L cerv SB w/ scap dep/retr             Bicep curls  1lb 2x10 1lb 2x10 nv 1lb 2x10 1lb 2x10 1lb 2x10 3x10 1 lb  3x10 1 lb NT   R scalene stretch 3x10\"     5x10\"       sidelying ER 2x10 2x10           Ther Activity                                       Gait Training                                       Modalities                                            "

## 2024-02-07 ENCOUNTER — OFFICE VISIT (OUTPATIENT)
Dept: PHYSICAL THERAPY | Facility: CLINIC | Age: 59
End: 2024-02-07
Payer: COMMERCIAL

## 2024-02-07 DIAGNOSIS — Z47.89 AFTERCARE FOLLOWING SURGERY OF THE MUSCULOSKELETAL SYSTEM: Primary | ICD-10-CM

## 2024-02-07 DIAGNOSIS — M25.511 RIGHT SHOULDER PAIN, UNSPECIFIED CHRONICITY: ICD-10-CM

## 2024-02-07 DIAGNOSIS — M54.2 NECK PAIN: ICD-10-CM

## 2024-02-07 PROCEDURE — 97110 THERAPEUTIC EXERCISES: CPT

## 2024-02-07 PROCEDURE — 97140 MANUAL THERAPY 1/> REGIONS: CPT

## 2024-02-07 NOTE — PROGRESS NOTES
"Daily Note    Today's date: 2024  Patient name: Marcella Eller  : 1965  MRN: [id number]  Referring provider: Adam Eng  Dx:   Encounter Diagnosis     ICD-10-CM    1. Aftercare following surgery of the musculoskeletal system  Z47.89       2. Neck pain  M54.2       3. Right shoulder pain, unspecified chronicity  M25.511             Start Time: 0830  Stop Time: 0916  Total time in clinic (min): 46 minutes    Subjective: Patient notes her L sided neck, shoulder, and arm are very painful and tight today. Reports she saw pain management who scheduled injections for her neck scheduled for the end of March and discussed median nerve block if needed following.    Objective: See treatment diary below        Assessment: Tolerated treatment fair.  R shoulder exercises not performed this session due to increased symptoms noted pre-session. Focused on cervical STM and stretching for pain management and relieving muscle tightness with relief noted following. Patient was fatigued post treatment and would benefit from skilled PT to address pain, remaining deficits and restore PLOF.           Plan: Progress treatment as tolerated.       Precautions: s/p C4-C5, C5-C6 ACDF on 23 with history of cervical myelopathy with myelomalacia. Precautions no lifting more than gallon of milk. Prefers to lie SEMIRECUMBENT    Manuals    Cervical mx work [facility] [place] [place]   R shoulder PRom MK and median nerve flossing MK and median nerve flossing [facility] MK MK MK JM    R shoulder mx work MK MK            scap mobs              Semi recumbent cerv dist/retraction    MK MK MK MK MK MK  MK   2nd rib mobs              Thoracic mobs              Neuro Re-Ed              Chin tucks 2x10 3\" holds w/ retraction  2x10 3\" holds w/ retraction  2x10 5\" holds w/ retraction  2x10 5\" holds w/ retraction  2x10 5\" holds w/ retraction  2x10 5\" holds w/ retr 2x10 5\" " "holds w/ retraction  2x10 5\" holds w/ retraction 2x10 5\" holds w/ retraction 2x10 5\" holds w/ retraction 2x10 5\" holds w/ retraction   scap 4 2x10 blue rows 2x10 blue rows 2x10 blue rows 2x10 blue rows 2x10 blue rows 2x10 blue rows 2x10 blue rows 220x 5\" holds 2x10 blue rows 2x10 blue rows    S/l mid/low trap PNF                                                                      Ther Ex              UBE or treadmill 5 min TM 5 min TM 5 min TM 5 min TM 5 min TM 5 min TM 5 min TM 5 min TM 5 min TM 5 min TM 5 min TM   Pulley 10\"x10 10\"x12 10\"x12           R UT stretch       5x10\" 5x 10\" holds 5x 10\" holds 5x 10\" holds 5x 10\" holds   Wall slides 15x table 15x table 15x table 15x table with FR 15x table with FR 15x table with FR 15x table with FR 15x table w/ foam roll 15x table w/ foam roll 15x table w/ foam roll    Self first rib mob              Shoulder isometrics    FLX, ER, IR 5\"x6 FLX, ABD, ER, IR 5\"x6 FLX, ABD, ER, IR 5\"x6 FLX, ABD, ER, IR 5\"x6 6x 5\" holds  6x 5\" holds  6x 5\" holds    R shoulder ER AAROM cane  nv 2x10 5\"x10 5\"x10 5\"x10 5\"x15 15x 5\" holds 15x 5\" holds 15x 5\" holds    Standing shoulder ER/IR              Seated thoracic extension              Doorway pec stretch  3x20\" holds            UT ed 2x10 9 lbs 2x10 9 lbs 2x10 9 lbs dc          Repeated R sidebend              Supine L cerv SB w/ scap dep/retr              Bicep curls  1lb 2x10 1lb 2x10 nv 1lb 2x10 1lb 2x10 1lb 2x10 3x10 1 lb  3x10 1 lb NT    R scalene stretch 3x10\"     5x10\"        sidelying ER 2x10 2x10            Ther Activity                                          Gait Training                                          Modalities              MHP           8 min                      "

## 2024-02-14 ENCOUNTER — APPOINTMENT (OUTPATIENT)
Dept: PHYSICAL THERAPY | Facility: CLINIC | Age: 59
End: 2024-02-14
Payer: COMMERCIAL

## 2024-02-21 ENCOUNTER — OFFICE VISIT (OUTPATIENT)
Dept: PHYSICAL THERAPY | Facility: CLINIC | Age: 59
End: 2024-02-21
Payer: COMMERCIAL

## 2024-02-21 DIAGNOSIS — Z47.89 AFTERCARE FOLLOWING SURGERY OF THE MUSCULOSKELETAL SYSTEM: Primary | ICD-10-CM

## 2024-02-21 DIAGNOSIS — M25.511 RIGHT SHOULDER PAIN, UNSPECIFIED CHRONICITY: ICD-10-CM

## 2024-02-21 DIAGNOSIS — M54.2 NECK PAIN: ICD-10-CM

## 2024-02-21 PROCEDURE — 97112 NEUROMUSCULAR REEDUCATION: CPT

## 2024-02-21 PROCEDURE — 97140 MANUAL THERAPY 1/> REGIONS: CPT

## 2024-02-21 PROCEDURE — 97110 THERAPEUTIC EXERCISES: CPT

## 2024-02-21 NOTE — PROGRESS NOTES
"Daily Note    Today's date: 2024  Patient name: Marcella Eller  : 1965  MRN: [id number]  Referring provider: Adam Eng  Dx:   Encounter Diagnosis     ICD-10-CM    1. Aftercare following surgery of the musculoskeletal system  Z47.89       2. Neck pain  M54.2       3. Right shoulder pain, unspecified chronicity  M25.511               Start Time: 0830  Stop Time: 0915  Total time in clinic (min): 45 minutes    Subjective: Patient notes she is feeling better today due to being inactive for 2 weeks from being sick. Reports she is scheduled for injections for her neck on .    Objective: See treatment diary below        Assessment: Tolerated treatment fair. Patient was fatigued post treatment and would benefit from skilled PT to address pain, remaining deficits and restore PLOF. Able to re-add R shoulder exercises this session with appropriate challenge noted to current exercise intensity. Decreased tension noted this session in her R cervical musculature however continues to have radicular symptoms down her RUE with STM.            Plan: Progress treatment as tolerated.       Precautions: s/p C4-C5, C5-C6 ACDF on 23 with history of cervical myelopathy with myelomalacia. Precautions no lifting more than gallon of milk. Prefers to lie SEMIRECUMBENT    Manuals    Cervical mx work [facility]   R shoulder [place] [facility] [place] [facility]   R shoulder mx work          scap mobs          Semi recumbent cerv dist/retraction [facility]   2nd rib mobs          Thoracic mobs          Neuro Re-Ed          Chin tucks 2x10 5\" holds w/ retr 2x10 5\" holds w/ retraction  2x10 5\" holds w/ retraction 2x10 5\" holds w/ retraction 2x10 5\" holds w/ retraction 2x10 5\" holds w/ retraction 2x10 5\" holds w/ retraction   scap 4 2x10 blue rows 2x10 blue rows 220x 5\" holds 2x10 blue rows 2x10 blue rows  2x10 blue rows   S/l mid/low trap PNF                                     " "             Ther Ex          UBE or treadmill 5 min TM 5 min TM 5 min TM 5 min TM 5 min TM 5 min TM 5 min TM   Pulley          R UT stretch  5x10\" 5x 10\" holds 5x 10\" holds 5x 10\" holds 5x 10\" holds 5x 10\" holds   Wall slides 15x table with FR 15x table with FR 15x table w/ foam roll 15x table w/ foam roll 15x table w/ foam roll  15x table w/ foam roll   Self first rib mob          Shoulder isometrics FLX, ABD, ER, IR 5\"x6 FLX, ABD, ER, IR 5\"x6 6x 5\" holds  6x 5\" holds  6x 5\" holds  6x 5\" holds   R shoulder ER AAROM cane 5\"x10 5\"x15 15x 5\" holds 15x 5\" holds 15x 5\" holds  15x 5\" holds   Standing shoulder ER/IR          Seated thoracic extension          Doorway pec stretch          UT ed          Repeated R sidebend          Supine L cerv SB w/ scap dep/retr          Bicep curls 1lb 2x10 1lb 2x10 3x10 1 lb  3x10 1 lb NT  1lb 2x10   R scalene stretch 5x10\"         sidelying ER          Ther Activity                              Gait Training                              Modalities          MHP      8 min                   "

## 2024-02-28 ENCOUNTER — OFFICE VISIT (OUTPATIENT)
Dept: PHYSICAL THERAPY | Facility: CLINIC | Age: 59
End: 2024-02-28
Payer: COMMERCIAL

## 2024-02-28 DIAGNOSIS — M54.2 NECK PAIN: ICD-10-CM

## 2024-02-28 DIAGNOSIS — M25.511 RIGHT SHOULDER PAIN, UNSPECIFIED CHRONICITY: ICD-10-CM

## 2024-02-28 DIAGNOSIS — Z47.89 AFTERCARE FOLLOWING SURGERY OF THE MUSCULOSKELETAL SYSTEM: Primary | ICD-10-CM

## 2024-02-28 PROCEDURE — 97112 NEUROMUSCULAR REEDUCATION: CPT

## 2024-02-28 PROCEDURE — 97140 MANUAL THERAPY 1/> REGIONS: CPT

## 2024-02-28 PROCEDURE — 97110 THERAPEUTIC EXERCISES: CPT

## 2024-02-28 NOTE — PROGRESS NOTES
"Daily Note    Today's date: 2024  Patient name: Marcella Eller  : 1965  MRN: [id number]  Referring provider: Adam Eng  Dx:   Encounter Diagnosis     ICD-10-CM    1. Aftercare following surgery of the musculoskeletal system  Z47.89       2. Neck pain  M54.2       3. Right shoulder pain, unspecified chronicity  M25.511                 Start Time: 0830  Stop Time: 0910  Total time in clinic (min): 40 minutes    Subjective: Patient notes \"everything is tight today\". Reports she is scheduled for injections for her neck on .    Objective: See treatment diary below        Assessment: Tolerated treatment well. Patient was fatigued post treatment and would benefit from skilled PT to address pain, remaining deficits and restore PLOF. Improved R shoulder PROM and AAROM noted this session with less reactivity noted. Continued tightness and restrictions in her R cervical and shoulder musculature with decreased tightness noted following manuals.            Plan: Progress treatment as tolerated.       Precautions: s/p C4-C5, C5-C6 ACDF on 23 with history of cervical myelopathy with myelomalacia. Precautions no lifting more than gallon of milk. Prefers to lie SEMIRECUMBENT    Manuals    Cervical mx work [facility] [place]   R shoulder PRom [facility]   R shoulder mx work           scap mobs           Semi recumbent cerv dist/retraction [facility] [place]   2nd rib mobs           Thoracic mobs           Neuro Re-Ed           Chin tucks 2x10 5\" holds w/ retr 2x10 5\" holds w/ retraction  2x10 5\" holds w/ retraction 2x10 5\" holds w/ retraction 2x10 5\" holds w/ retraction 2x10 5\" holds w/ retraction 2x10 5\" holds w/ retraction 2x10 5\" holds w/ retraction   scap 4 2x10 blue rows 2x10 blue rows 220x 5\" holds 2x10 blue rows 2x10 blue rows  2x10 blue rows    S/l mid/low trap PNF                                                       Ther Ex         " "  UBE or treadmill 5 min TM 5 min TM 5 min TM 5 min TM 5 min TM 5 min TM 5 min TM 5 min TM   Pulley           R UT stretch  5x10\" 5x 10\" holds 5x 10\" holds 5x 10\" holds 5x 10\" holds 5x 10\" holds 5x 10\" holds   Wall slides 15x table with FR 15x table with FR 15x table w/ foam roll 15x table w/ foam roll 15x table w/ foam roll  15x table w/ foam roll 15x table w/ foam roll   Self first rib mob           Shoulder isometrics FLX, ABD, ER, IR 5\"x6 FLX, ABD, ER, IR 5\"x6 6x 5\" holds  6x 5\" holds  6x 5\" holds  6x 5\" holds 5x 10\" holds   R shoulder ER AAROM cane 5\"x10 5\"x15 15x 5\" holds 15x 5\" holds 15x 5\" holds  15x 5\" holds 15x 5\" holds   Standing shoulder ER/IR           Seated thoracic extension           Doorway pec stretch           UT ed           Repeated R sidebend           Supine L cerv SB w/ scap dep/retr           Bicep curls 1lb 2x10 1lb 2x10 3x10 1 lb  3x10 1 lb NT  1lb 2x10 1lb 2x10   R scalene stretch 5x10\"          sidelying ER           Ther Activity                                 Gait Training                                 Modalities           MHP      8 min                     "

## 2024-03-06 ENCOUNTER — OFFICE VISIT (OUTPATIENT)
Dept: PHYSICAL THERAPY | Facility: CLINIC | Age: 59
End: 2024-03-06
Payer: COMMERCIAL

## 2024-03-06 DIAGNOSIS — Z47.89 AFTERCARE FOLLOWING SURGERY OF THE MUSCULOSKELETAL SYSTEM: Primary | ICD-10-CM

## 2024-03-06 DIAGNOSIS — M54.2 NECK PAIN: ICD-10-CM

## 2024-03-06 DIAGNOSIS — M25.511 RIGHT SHOULDER PAIN, UNSPECIFIED CHRONICITY: ICD-10-CM

## 2024-03-06 PROCEDURE — 97110 THERAPEUTIC EXERCISES: CPT

## 2024-03-06 PROCEDURE — 97140 MANUAL THERAPY 1/> REGIONS: CPT

## 2024-03-06 PROCEDURE — 97112 NEUROMUSCULAR REEDUCATION: CPT

## 2024-03-06 NOTE — PROGRESS NOTES
"Daily Note    Today's date: 3/6/2024  Patient name: Marcella Eller  : 1965  MRN: [id number]  Referring provider: Adam Eng  Dx:   Encounter Diagnosis     ICD-10-CM    1. Aftercare following surgery of the musculoskeletal system  Z47.89       2. Neck pain  M54.2       3. Right shoulder pain, unspecified chronicity  M25.511             Start Time: 0915  Stop Time: 1000  Total time in clinic (min): 45 minutes    Subjective: Patient notes she has increased R sided neck stiffness and R arm pain beginning yesterday. Reports she is scheduled for injections for her neck on .    Objective: See treatment diary below      Assessment: Tolerated treatment well. Patient was fatigued post treatment and would benefit from skilled PT to address pain, remaining deficits and restore PLOF. Increased tightness and reactivity noted in her R UT, scalenes, and cervical paraspinals this session with decreased tension noted following STM. Exercise program modified and MHP applied post session due to increased pain and tightness noted pre session.              Plan: Progress treatment as tolerated.       Precautions: s/p C4-C5, C5-C6 ACDF on 23 with history of cervical myelopathy with myelomalacia. Precautions no lifting more than gallon of milk. Prefers to lie SEMIRECUMBENT    Manuals  3/6   Cervical mx work [facility] [place]   R shoulder PRom [facility] [place]   R shoulder mx work            scap mobs            Semi recumbent cerv dist/retraction [facility] [place]   2nd rib mobs            Thoracic mobs            Neuro Re-Ed            Chin tucks 2x10 5\" holds w/ retr 2x10 5\" holds w/ retraction  2x10 5\" holds w/ retraction 2x10 5\" holds w/ retraction 2x10 5\" holds w/ retraction 2x10 5\" holds w/ retraction 2x10 5\" holds w/ retraction 2x10 5\" holds w/ retraction 2x10 5\" holds w/ retraction   scap 4 2x10 blue rows 2x10 blue rows 220x 5\" holds 2x10 " "blue rows 2x10 blue rows  2x10 blue rows     S/l mid/low trap PNF                                                            Ther Ex            UBE or treadmill 5 min TM 5 min TM 5 min TM 5 min TM 5 min TM 5 min TM 5 min TM 5 min TM 5 min TM   Pulley            R UT stretch  5x10\" 5x 10\" holds 5x 10\" holds 5x 10\" holds 5x 10\" holds 5x 10\" holds 5x 10\" holds 5x 10\" holds   Wall slides 15x table with FR 15x table with FR 15x table w/ foam roll 15x table w/ foam roll 15x table w/ foam roll  15x table w/ foam roll 15x table w/ foam roll 15x table w/ foam roll   Self first rib mob            Shoulder isometrics FLX, ABD, ER, IR 5\"x6 FLX, ABD, ER, IR 5\"x6 6x 5\" holds  6x 5\" holds  6x 5\" holds  6x 5\" holds 5x 10\" holds    R shoulder ER AAROM cane 5\"x10 5\"x15 15x 5\" holds 15x 5\" holds 15x 5\" holds  15x 5\" holds 15x 5\" holds 15x 5\" holds   Standing shoulder ER/IR            Seated thoracic extension            Doorway pec stretch            UT ed            Repeated R sidebend            Supine L cerv SB w/ scap dep/retr            Bicep curls 1lb 2x10 1lb 2x10 3x10 1 lb  3x10 1 lb NT  1lb 2x10 1lb 2x10 1lb 2x10   R scalene stretch 5x10\"           sidelying ER            Ther Activity                                    Gait Training                                    Modalities            MHP      8 min   8 min                    "

## 2024-03-13 ENCOUNTER — OFFICE VISIT (OUTPATIENT)
Dept: PHYSICAL THERAPY | Facility: CLINIC | Age: 59
End: 2024-03-13
Payer: COMMERCIAL

## 2024-03-13 DIAGNOSIS — M54.2 NECK PAIN: ICD-10-CM

## 2024-03-13 DIAGNOSIS — M25.511 RIGHT SHOULDER PAIN, UNSPECIFIED CHRONICITY: ICD-10-CM

## 2024-03-13 DIAGNOSIS — Z47.89 AFTERCARE FOLLOWING SURGERY OF THE MUSCULOSKELETAL SYSTEM: Primary | ICD-10-CM

## 2024-03-13 PROCEDURE — 97140 MANUAL THERAPY 1/> REGIONS: CPT

## 2024-03-13 PROCEDURE — 97110 THERAPEUTIC EXERCISES: CPT

## 2024-03-13 PROCEDURE — 97112 NEUROMUSCULAR REEDUCATION: CPT

## 2024-03-13 NOTE — PROGRESS NOTES
"Daily Note    Today's date: 3/13/2024  Patient name: Marcella Eller  : 1965  MRN: [id number]  Referring provider: Adam Eng  Dx:   Encounter Diagnosis     ICD-10-CM    1. Aftercare following surgery of the musculoskeletal system  Z47.89       2. Neck pain  M54.2       3. Right shoulder pain, unspecified chronicity  M25.511               Start Time: 0915  Stop Time: 1000  Total time in clinic (min): 45 minutes    Subjective: Patient notes continued R sided stiffness and pain that has been worsening the past 2 days. Reports she had relief for a few days following last session but the tightness returns. Reports she is scheduled for injections for her neck on .    Objective: See treatment diary below      Assessment: Tolerated treatment well. Patient was fatigued post treatment and would benefit from skilled PT to address pain, remaining deficits and restore PLOF. Addressed R sided cervical and shoulder soft tissue restrictions with improved muscle flexibility noted following STM. Fatigue and fair tolerance noted with there-ex limiting ability to progress exercise intensity at this time.           Plan: Progress treatment as tolerated.       Precautions: s/p C4-C5, C5-C6 ACDF on 23 with history of cervical myelopathy with myelomalacia. Precautions no lifting more than gallon of milk. Prefers to lie SEMIRECUMBENT    Manuals 12/20 12/27 1/11 1/24 1/31 2/7 2/21 2/28 3/6 3/13   Cervical mx work [facility] [place]   R shoulder PRom [facility] [place]   R shoulder mx work             scap mobs             Semi recumbent cerv dist/retraction MK [facility] MK   2nd rib mobs             Thoracic mobs             Neuro Re-Ed             Chin tucks 2x10 5\" holds w/ retr 2x10 5\" holds w/ retraction  2x10 5\" holds w/ retraction 2x10 5\" holds w/ retraction 2x10 5\" holds w/ retraction 2x10 5\" holds w/ retraction 2x10 5\" holds w/ retraction 2x10 5\" holds w/ retraction 2x10 5\" " "holds w/ retraction 2x10 5\" holds w/ retraction   scap 4 2x10 blue rows 2x10 blue rows 220x 5\" holds 2x10 blue rows 2x10 blue rows  2x10 blue rows      S/l mid/low trap PNF                                                                 Ther Ex             UBE or treadmill 5 min TM 5 min TM 5 min TM 5 min TM 5 min TM 5 min TM 5 min TM 5 min TM 5 min TM 5 min TM   Cassie             R UT stretch  5x10\" 5x 10\" holds 5x 10\" holds 5x 10\" holds 5x 10\" holds 5x 10\" holds 5x 10\" holds 5x 10\" holds 5x 10\" holds   Wall slides 15x table with FR 15x table with FR 15x table w/ foam roll 15x table w/ foam roll 15x table w/ foam roll  15x table w/ foam roll 15x table w/ foam roll 15x table w/ foam roll 15x table w/ foam roll   Self first rib mob             Shoulder isometrics FLX, ABD, ER, IR 5\"x6 FLX, ABD, ER, IR 5\"x6 6x 5\" holds  6x 5\" holds  6x 5\" holds  6x 5\" holds 5x 10\" holds     R shoulder ER AAROM cane 5\"x10 5\"x15 15x 5\" holds 15x 5\" holds 15x 5\" holds  15x 5\" holds 15x 5\" holds 15x 5\" holds 15x 5\" holds   Standing shoulder ER/IR             Seated thoracic extension             Doorway pec stretch             UT ed             Repeated R sidebend             Supine L cerv SB w/ scap dep/retr             Bicep curls 1lb 2x10 1lb 2x10 3x10 1 lb  3x10 1 lb NT  1lb 2x10 1lb 2x10 1lb 2x10 1lb 2x10   R scalene stretch 5x10\"            sidelying ER             Ther Activity                                       Gait Training                                       Modalities             MHP      8 min   8 min 8 min                     "

## 2024-03-20 ENCOUNTER — APPOINTMENT (OUTPATIENT)
Dept: PHYSICAL THERAPY | Facility: CLINIC | Age: 59
End: 2024-03-20
Payer: COMMERCIAL

## 2024-03-27 ENCOUNTER — APPOINTMENT (OUTPATIENT)
Dept: PHYSICAL THERAPY | Facility: CLINIC | Age: 59
End: 2024-03-27
Payer: COMMERCIAL

## (undated) DEVICE — SUTURE VICRYL 3-0 J416H SH UNDYED

## (undated) DEVICE — BURR LONG MIDAS AM-8

## (undated) DEVICE — TAPE DURAPORE 3IN

## (undated) DEVICE — INSTRUMENT 10 FRENCH FRAZIER VENT DISPOSABLE

## (undated) DEVICE — Device

## (undated) DEVICE — GAUZE 8X4 16 PLY RFID DOUBLE XRAY

## (undated) DEVICE — GOWN SIRUS FABRNF RAGLAN XL ST 28/CS

## (undated) DEVICE — SUTURE BIOSYN 3-0 UNDYED 1X30 P-12

## (undated) DEVICE — SUTURE COTTON UMBILICAL TAPE 44942 2X30

## (undated) DEVICE — SPONGE DISSECTION

## (undated) DEVICE — PACK RFID BASIC NEURO CUSTOM

## (undated) DEVICE — COVER DOME 22IN STERILE

## (undated) DEVICE — APPLICATOR CHLORAPREP 26ML ORANGE TINT

## (undated) DEVICE — BANDAGE TENSOPLAST 4IN X 5YDS

## (undated) DEVICE — SEPPS BENZOIN TINCTURE

## (undated) DEVICE — TIP BOVIE BLADE COATED INSULATED 2.75IN

## (undated) DEVICE — BRUSH SCRUB WITH HIBICLENS

## (undated) DEVICE — TIP BOVIE EXTENSION REUSABLE 4IN

## (undated) DEVICE — DRAPE C-ARMOR

## (undated) DEVICE — TUBING SMOKE EVAC PENCIL COATED

## (undated) DEVICE — MANIFOLD SINGLE PORT NEPTUNE

## (undated) DEVICE — PIN DISTRACTION STERILE CASPAR 14MM

## (undated) DEVICE — PACK UNIVERSAL ULTRAGARD 5/CS

## (undated) DEVICE — SOLN IRRIG .9%SOD 1000ML

## (undated) DEVICE — ADHESIVE SKIN DERMABOND ADVANCED 0.7ML

## (undated) DEVICE — GLOVE SZ 8 PROTEXIS PI

## (undated) DEVICE — DRAIN SUCTION ROUND  7FR

## (undated) DEVICE — SPONGE GAUZE 4X4 4 PLY-2S

## (undated) DEVICE — NEEDLE SPINAL 18G X3-1-2IN

## (undated) DEVICE — CATH IV 14G X 2IN ACUVANCE PLUS

## (undated) DEVICE — SPONGE DRESSING DRAIN STERILE EXCILON 2S

## (undated) DEVICE — IMPLANTABLE DEVICE
Type: IMPLANTABLE DEVICE | Site: SPINE CERVICAL | Status: NON-FUNCTIONAL
Removed: 2023-07-26

## (undated) DEVICE — DRESSING TEGADERM 4X4 3/4

## (undated) DEVICE — SPONGE SURGIFOAM ABS GELATIN 12-7 TEMP CONTROLLED 59-86 F

## (undated) DEVICE — SUTURE POLYSORB 2-0 UNDYED 1X30 V-20

## (undated) DEVICE — TOWEL SURGICAL W17XL27IN BLUE COTTON STANDARD PREWASHED DELI

## (undated) DEVICE — TRAY URINE METER FOLEY NON-SILVER

## (undated) DEVICE — SURGIFLO HEMOSTATIC MATRIX 8ML

## (undated) DEVICE — RESERVOIR DRAIN 100ML